# Patient Record
Sex: FEMALE | Race: WHITE | HISPANIC OR LATINO | Employment: UNEMPLOYED | URBAN - METROPOLITAN AREA
[De-identification: names, ages, dates, MRNs, and addresses within clinical notes are randomized per-mention and may not be internally consistent; named-entity substitution may affect disease eponyms.]

---

## 2018-06-13 LAB
ABSOL LYMPHOCYTES (HISTORICAL): 2.2 K/UL (ref 0.5–4)
ALBUMIN SERPL BCP-MCNC: 4 G/DL (ref 3–5.2)
ALP SERPL-CCNC: 95 U/L (ref 43–122)
ALT SERPL W P-5'-P-CCNC: 33 U/L (ref 9–52)
ANION GAP SERPL CALCULATED.3IONS-SCNC: 11 MMOL/L (ref 5–14)
AST SERPL W P-5'-P-CCNC: 18 U/L (ref 14–36)
BASOPHILS # BLD AUTO: 0 % (ref 0–1)
BASOPHILS # BLD AUTO: 0 K/UL (ref 0–0.1)
BILIRUB SERPL-MCNC: 0.1 MG/DL
BILIRUB UR QL STRIP: NEGATIVE MG/DL
BUN SERPL-MCNC: 8 MG/DL (ref 5–25)
CALCIUM SERPL-MCNC: 9.7 MG/DL (ref 8.4–10.2)
CHLORIDE SERPL-SCNC: 104 MEQ/L (ref 97–108)
CLARITY UR: CLEAR
CO2 SERPL-SCNC: 24 MMOL/L (ref 22–30)
COLOR UR: YELLOW
CREATINE, SERUM (HISTORICAL): 0.53 MG/DL (ref 0.6–1.2)
DEPRECATED RDW RBC AUTO: 14 %
EGFR (HISTORICAL): >60 ML/MIN/1.73 M2
EOSINOPHIL # BLD AUTO: 0.1 K/UL (ref 0–0.4)
EOSINOPHIL NFR BLD AUTO: 1 % (ref 0–6)
GLUCOSE FASTING (HISTORICAL): 92 MG/DL (ref 70–99)
GLUCOSE UR STRIP-MCNC: NEGATIVE MG/DL
HCT VFR BLD AUTO: 38.4 % (ref 36–46)
HGB BLD-MCNC: 12.4 G/DL (ref 12–16)
HGB UR QL STRIP.AUTO: NEGATIVE
KETONES UR STRIP-MCNC: NEGATIVE MG/DL
LEUKOCYTE ESTERASE UR QL STRIP: NEGATIVE
LYMPHOCYTES NFR BLD AUTO: 26 % (ref 25–45)
MCH RBC QN AUTO: 26.3 PG (ref 26–34)
MCHC RBC AUTO-ENTMCNC: 32.4 % (ref 31–36)
MCV RBC AUTO: 81 FL (ref 80–100)
MONOCYTES # BLD AUTO: 0.6 K/UL (ref 0.2–0.9)
MONOCYTES NFR BLD AUTO: 7 % (ref 1–10)
NEUTROPHILS ABS COUNT (HISTORICAL): 5.5 K/UL (ref 1.8–7.8)
NEUTS SEG NFR BLD AUTO: 66 % (ref 45–65)
NITRITE UR QL STRIP: NEGATIVE
PH UR STRIP.AUTO: 6 [PH] (ref 4.5–8)
PLATELET # BLD AUTO: 360 K/MCL (ref 150–450)
POTASSIUM SERPL-SCNC: 4.5 MEQ/L (ref 3.6–5)
PROT UR STRIP-MCNC: NEGATIVE MG/DL
RBC # BLD AUTO: 4.73 M/MCL (ref 4–5.2)
SODIUM SERPL-SCNC: 139 MEQ/L (ref 137–147)
SP GR UR STRIP.AUTO: 1.02 (ref 1–1.04)
TOTAL PROTEIN (HISTORICAL): 7.5 G/DL (ref 5.9–8.4)
UROBILINOGEN UR QL STRIP.AUTO: NEGATIVE MG/DL (ref 0–1)
WBC # BLD AUTO: 8.4 K/MCL (ref 4.5–11)

## 2018-06-27 ENCOUNTER — OFFICE VISIT (OUTPATIENT)
Dept: SURGERY | Facility: CLINIC | Age: 23
End: 2018-06-27

## 2018-06-27 VITALS
DIASTOLIC BLOOD PRESSURE: 76 MMHG | HEIGHT: 61 IN | WEIGHT: 211.9 LBS | BODY MASS INDEX: 40.01 KG/M2 | RESPIRATION RATE: 16 BRPM | HEART RATE: 92 BPM | SYSTOLIC BLOOD PRESSURE: 138 MMHG

## 2018-06-27 DIAGNOSIS — E66.01 MORBID (SEVERE) OBESITY DUE TO EXCESS CALORIES (HCC): Primary | ICD-10-CM

## 2018-06-27 PROCEDURE — 99024 POSTOP FOLLOW-UP VISIT: CPT | Performed by: SPECIALIST

## 2018-06-27 RX ORDER — OXYCODONE AND ACETAMINOPHEN 10; 325 MG/1; MG/1
1 TABLET ORAL EVERY 4 HOURS PRN
COMMUNITY
End: 2018-12-12 | Stop reason: HOSPADM

## 2018-12-10 ENCOUNTER — HOSPITAL ENCOUNTER (INPATIENT)
Facility: HOSPITAL | Age: 23
LOS: 2 days | Discharge: HOME | DRG: 263 | End: 2018-12-12
Attending: SPECIALIST | Admitting: SPECIALIST
Payer: COMMERCIAL

## 2018-12-10 DIAGNOSIS — R00.1 BRADYCARDIA: ICD-10-CM

## 2018-12-10 DIAGNOSIS — E83.42 HYPOMAGNESEMIA: ICD-10-CM

## 2018-12-10 DIAGNOSIS — R74.01 TRANSAMINITIS: ICD-10-CM

## 2018-12-10 DIAGNOSIS — D64.9 ANEMIA, UNSPECIFIED TYPE: ICD-10-CM

## 2018-12-10 DIAGNOSIS — K80.40 CHOLEDOCHOLITHIASIS WITH CHOLECYSTITIS: Primary | ICD-10-CM

## 2018-12-10 DIAGNOSIS — K80.00 CALCULUS OF GALLBLADDER WITH ACUTE CHOLECYSTITIS WITHOUT OBSTRUCTION: ICD-10-CM

## 2018-12-10 PROBLEM — K80.50 CHOLEDOCHOLITHIASIS: Status: ACTIVE | Noted: 2018-12-10

## 2018-12-10 PROBLEM — R79.89 ELEVATED LFTS: Status: ACTIVE | Noted: 2018-12-10

## 2018-12-10 PROBLEM — K80.20 GALL STONE: Status: ACTIVE | Noted: 2018-12-10

## 2018-12-10 PROBLEM — K81.9 CHOLECYSTITIS: Status: ACTIVE | Noted: 2018-12-10

## 2018-12-10 LAB
APTT PPP: 30 SECONDS (ref 23–34)
INR PPP: 1.28 (ref 0.89–1.1)
PROTHROMBIN TIME: 13.4 SECONDS (ref 9.5–11.6)

## 2018-12-10 PROCEDURE — 99252 IP/OBS CONSLTJ NEW/EST SF 35: CPT | Performed by: INTERNAL MEDICINE

## 2018-12-10 PROCEDURE — 85730 THROMBOPLASTIN TIME PARTIAL: CPT | Performed by: SPECIALIST

## 2018-12-10 PROCEDURE — 99222 1ST HOSP IP/OBS MODERATE 55: CPT | Performed by: SPECIALIST

## 2018-12-10 PROCEDURE — 85610 PROTHROMBIN TIME: CPT | Performed by: SPECIALIST

## 2018-12-10 RX ORDER — ONDANSETRON 2 MG/ML
4 INJECTION INTRAMUSCULAR; INTRAVENOUS EVERY 6 HOURS PRN
Status: DISCONTINUED | OUTPATIENT
Start: 2018-12-10 | End: 2018-12-12

## 2018-12-10 RX ORDER — NICOTINE 21 MG/24HR
21 PATCH, TRANSDERMAL 24 HOURS TRANSDERMAL DAILY
Status: DISCONTINUED | OUTPATIENT
Start: 2018-12-11 | End: 2018-12-12

## 2018-12-10 RX ORDER — CEFAZOLIN SODIUM 2 G/50ML
2000 SOLUTION INTRAVENOUS EVERY 8 HOURS
Status: DISCONTINUED | OUTPATIENT
Start: 2018-12-10 | End: 2018-12-11

## 2018-12-10 RX ORDER — CEFAZOLIN SODIUM 2 G/50ML
2000 SOLUTION INTRAVENOUS EVERY 8 HOURS
Status: DISCONTINUED | OUTPATIENT
Start: 2018-12-10 | End: 2018-12-10

## 2018-12-10 RX ORDER — SODIUM CHLORIDE, SODIUM LACTATE, POTASSIUM CHLORIDE, CALCIUM CHLORIDE 600; 310; 30; 20 MG/100ML; MG/100ML; MG/100ML; MG/100ML
125 INJECTION, SOLUTION INTRAVENOUS CONTINUOUS
Status: DISCONTINUED | OUTPATIENT
Start: 2018-12-10 | End: 2018-12-12 | Stop reason: HOSPADM

## 2018-12-10 RX ORDER — PANTOPRAZOLE SODIUM 40 MG/1
40 INJECTION, POWDER, FOR SOLUTION INTRAVENOUS
Status: DISCONTINUED | OUTPATIENT
Start: 2018-12-11 | End: 2018-12-12

## 2018-12-10 RX ORDER — HYDROMORPHONE HCL/PF 1 MG/ML
0.5 SYRINGE (ML) INJECTION
Status: DISCONTINUED | OUTPATIENT
Start: 2018-12-10 | End: 2018-12-11

## 2018-12-10 RX ADMIN — ONDANSETRON HYDROCHLORIDE 4 MG: 2 INJECTION, SOLUTION INTRAMUSCULAR; INTRAVENOUS at 21:58

## 2018-12-10 RX ADMIN — CEFAZOLIN SODIUM 2000 MG: 2 SOLUTION INTRAVENOUS at 21:42

## 2018-12-10 RX ADMIN — SODIUM CHLORIDE, POTASSIUM CHLORIDE, SODIUM LACTATE AND CALCIUM CHLORIDE 125 ML/HR: 600; 310; 30; 20 INJECTION, SOLUTION INTRAVENOUS at 20:08

## 2018-12-10 RX ADMIN — AMPICILLIN AND SULBACTAM 3 G: 2; 1 INJECTION, POWDER, FOR SOLUTION INTRAMUSCULAR; INTRAVENOUS at 20:08

## 2018-12-11 ENCOUNTER — ANESTHESIA EVENT (INPATIENT)
Dept: PERIOP | Facility: HOSPITAL | Age: 23
DRG: 263 | End: 2018-12-11
Payer: COMMERCIAL

## 2018-12-11 ENCOUNTER — APPOINTMENT (INPATIENT)
Dept: RADIOLOGY | Facility: HOSPITAL | Age: 23
DRG: 263 | End: 2018-12-11
Payer: COMMERCIAL

## 2018-12-11 ENCOUNTER — APPOINTMENT (INPATIENT)
Dept: ULTRASOUND IMAGING | Facility: HOSPITAL | Age: 23
DRG: 263 | End: 2018-12-11
Payer: COMMERCIAL

## 2018-12-11 ENCOUNTER — ANESTHESIA (INPATIENT)
Dept: PERIOP | Facility: HOSPITAL | Age: 23
DRG: 263 | End: 2018-12-11
Payer: COMMERCIAL

## 2018-12-11 PROBLEM — R00.1 BRADYCARDIA: Status: ACTIVE | Noted: 2018-12-11

## 2018-12-11 PROBLEM — K80.40 CHOLEDOCHOLITHIASIS WITH CHOLECYSTITIS: Status: ACTIVE | Noted: 2018-12-10

## 2018-12-11 PROBLEM — Z98.84 HISTORY OF GASTRIC BYPASS: Status: ACTIVE | Noted: 2018-12-11

## 2018-12-11 PROBLEM — K80.00 CALCULUS OF GALLBLADDER WITH ACUTE CHOLECYSTITIS WITHOUT OBSTRUCTION: Status: ACTIVE | Noted: 2018-12-10

## 2018-12-11 PROBLEM — K76.0 FATTY INFILTRATION OF LIVER: Status: ACTIVE | Noted: 2018-12-11

## 2018-12-11 PROBLEM — Z72.0 TOBACCO ABUSE: Status: ACTIVE | Noted: 2018-12-11

## 2018-12-11 PROBLEM — R74.01 TRANSAMINITIS: Status: ACTIVE | Noted: 2018-12-10

## 2018-12-11 LAB
ALBUMIN SERPL BCP-MCNC: 3.2 G/DL (ref 3–5.2)
ALP SERPL-CCNC: 139 U/L (ref 43–122)
ALT SERPL W P-5'-P-CCNC: 88 U/L (ref 9–52)
ANION GAP SERPL CALCULATED.3IONS-SCNC: 5 MMOL/L (ref 5–14)
APTT PPP: 31 SECONDS (ref 23–34)
AST SERPL W P-5'-P-CCNC: 73 U/L (ref 14–36)
BASOPHILS # BLD AUTO: 0 THOUSANDS/ΜL (ref 0–0.1)
BASOPHILS NFR BLD AUTO: 1 % (ref 0–1)
BILIRUB SERPL-MCNC: 0.2 MG/DL
BUN SERPL-MCNC: 4 MG/DL (ref 5–25)
CALCIUM SERPL-MCNC: 8.4 MG/DL (ref 8.4–10.2)
CHLORIDE SERPL-SCNC: 105 MMOL/L (ref 97–108)
CO2 SERPL-SCNC: 28 MMOL/L (ref 22–30)
CREAT SERPL-MCNC: 0.51 MG/DL (ref 0.6–1.2)
EOSINOPHIL # BLD AUTO: 0.1 THOUSAND/ΜL (ref 0–0.4)
EOSINOPHIL NFR BLD AUTO: 1 % (ref 0–6)
ERYTHROCYTE [DISTWIDTH] IN BLOOD BY AUTOMATED COUNT: 15.4 %
GFR SERPL CREATININE-BSD FRML MDRD: 135 ML/MIN/1.73SQ M
GLUCOSE SERPL-MCNC: 79 MG/DL (ref 70–99)
HCG SERPL QL: NEGATIVE
HCT VFR BLD AUTO: 34.2 % (ref 36–46)
HGB BLD-MCNC: 10.8 G/DL (ref 12–16)
INR PPP: 1.2 (ref 0.89–1.1)
LYMPHOCYTES # BLD AUTO: 2.1 THOUSANDS/ΜL (ref 0.5–4)
LYMPHOCYTES NFR BLD AUTO: 30 % (ref 25–45)
MCH RBC QN AUTO: 26.6 PG (ref 26–34)
MCHC RBC AUTO-ENTMCNC: 31.7 G/DL (ref 31–36)
MCV RBC AUTO: 84 FL (ref 80–100)
MONOCYTES # BLD AUTO: 0.5 THOUSAND/ΜL (ref 0.2–0.9)
MONOCYTES NFR BLD AUTO: 7 % (ref 1–10)
NEUTROPHILS # BLD AUTO: 4.3 THOUSANDS/ΜL (ref 1.8–7.8)
NEUTS SEG NFR BLD AUTO: 62 % (ref 45–65)
PLATELET # BLD AUTO: 224 THOUSANDS/UL (ref 150–450)
PMV BLD AUTO: 9.8 FL (ref 8.9–12.7)
POTASSIUM SERPL-SCNC: 3.7 MMOL/L (ref 3.6–5)
PROT SERPL-MCNC: 6.1 G/DL (ref 5.9–8.4)
PROTHROMBIN TIME: 12.6 SECONDS (ref 9.5–11.6)
RBC # BLD AUTO: 4.07 MILLION/UL (ref 4–5.2)
SODIUM SERPL-SCNC: 138 MMOL/L (ref 137–147)
WBC # BLD AUTO: 6.9 THOUSAND/UL (ref 4.5–11)

## 2018-12-11 PROCEDURE — 85025 COMPLETE CBC W/AUTO DIFF WBC: CPT | Performed by: SPECIALIST

## 2018-12-11 PROCEDURE — 47563 LAPARO CHOLECYSTECTOMY/GRAPH: CPT | Performed by: SPECIALIST

## 2018-12-11 PROCEDURE — C1729 CATH, DRAINAGE: HCPCS | Performed by: SPECIALIST

## 2018-12-11 PROCEDURE — 99232 SBSQ HOSP IP/OBS MODERATE 35: CPT | Performed by: INTERNAL MEDICINE

## 2018-12-11 PROCEDURE — 0FT44ZZ RESECTION OF GALLBLADDER, PERCUTANEOUS ENDOSCOPIC APPROACH: ICD-10-PCS | Performed by: SPECIALIST

## 2018-12-11 PROCEDURE — 84703 CHORIONIC GONADOTROPIN ASSAY: CPT | Performed by: SPECIALIST

## 2018-12-11 PROCEDURE — 76705 ECHO EXAM OF ABDOMEN: CPT

## 2018-12-11 PROCEDURE — 47531 INJECTION FOR CHOLANGIOGRAM: CPT

## 2018-12-11 PROCEDURE — 85610 PROTHROMBIN TIME: CPT | Performed by: SPECIALIST

## 2018-12-11 PROCEDURE — 88304 TISSUE EXAM BY PATHOLOGIST: CPT | Performed by: PATHOLOGY

## 2018-12-11 PROCEDURE — 80053 COMPREHEN METABOLIC PANEL: CPT | Performed by: SPECIALIST

## 2018-12-11 PROCEDURE — BF101ZZ FLUOROSCOPY OF BILE DUCTS USING LOW OSMOLAR CONTRAST: ICD-10-PCS | Performed by: SPECIALIST

## 2018-12-11 PROCEDURE — 85730 THROMBOPLASTIN TIME PARTIAL: CPT | Performed by: SPECIALIST

## 2018-12-11 PROCEDURE — C9113 INJ PANTOPRAZOLE SODIUM, VIA: HCPCS | Performed by: SPECIALIST

## 2018-12-11 RX ORDER — DEXAMETHASONE SODIUM PHOSPHATE 4 MG/ML
INJECTION, SOLUTION INTRA-ARTICULAR; INTRALESIONAL; INTRAMUSCULAR; INTRAVENOUS; SOFT TISSUE AS NEEDED
Status: DISCONTINUED | OUTPATIENT
Start: 2018-12-11 | End: 2018-12-11 | Stop reason: SURG

## 2018-12-11 RX ORDER — BUPIVACAINE HYDROCHLORIDE 5 MG/ML
INJECTION, SOLUTION PERINEURAL AS NEEDED
Status: DISCONTINUED | OUTPATIENT
Start: 2018-12-11 | End: 2018-12-11 | Stop reason: HOSPADM

## 2018-12-11 RX ORDER — OXYCODONE HYDROCHLORIDE AND ACETAMINOPHEN 5; 325 MG/1; MG/1
1 TABLET ORAL EVERY 4 HOURS PRN
Qty: 20 TABLET | Refills: 0 | Status: SHIPPED | OUTPATIENT
Start: 2018-12-11 | End: 2018-12-12 | Stop reason: HOSPADM

## 2018-12-11 RX ORDER — KETOROLAC TROMETHAMINE 30 MG/ML
INJECTION, SOLUTION INTRAMUSCULAR; INTRAVENOUS AS NEEDED
Status: DISCONTINUED | OUTPATIENT
Start: 2018-12-11 | End: 2018-12-11 | Stop reason: SURG

## 2018-12-11 RX ORDER — FENTANYL CITRATE 50 UG/ML
INJECTION, SOLUTION INTRAMUSCULAR; INTRAVENOUS AS NEEDED
Status: DISCONTINUED | OUTPATIENT
Start: 2018-12-11 | End: 2018-12-11 | Stop reason: SURG

## 2018-12-11 RX ORDER — FENTANYL CITRATE/PF 50 MCG/ML
25 SYRINGE (ML) INJECTION
Status: DISCONTINUED | OUTPATIENT
Start: 2018-12-11 | End: 2018-12-11 | Stop reason: HOSPADM

## 2018-12-11 RX ORDER — HEPARIN SODIUM 5000 [USP'U]/ML
INJECTION, SOLUTION INTRAVENOUS; SUBCUTANEOUS AS NEEDED
Status: DISCONTINUED | OUTPATIENT
Start: 2018-12-11 | End: 2018-12-11 | Stop reason: SURG

## 2018-12-11 RX ORDER — NEOSTIGMINE METHYLSULFATE 1 MG/ML
INJECTION INTRAVENOUS AS NEEDED
Status: DISCONTINUED | OUTPATIENT
Start: 2018-12-11 | End: 2018-12-11 | Stop reason: SURG

## 2018-12-11 RX ORDER — OXYCODONE HYDROCHLORIDE AND ACETAMINOPHEN 5; 325 MG/1; MG/1
2 TABLET ORAL EVERY 4 HOURS PRN
Status: DISCONTINUED | OUTPATIENT
Start: 2018-12-11 | End: 2018-12-12 | Stop reason: HOSPADM

## 2018-12-11 RX ORDER — MIDAZOLAM HYDROCHLORIDE 1 MG/ML
INJECTION INTRAMUSCULAR; INTRAVENOUS AS NEEDED
Status: DISCONTINUED | OUTPATIENT
Start: 2018-12-11 | End: 2018-12-11 | Stop reason: SURG

## 2018-12-11 RX ORDER — SODIUM CHLORIDE 9 MG/ML
INJECTION, SOLUTION INTRAVENOUS AS NEEDED
Status: DISCONTINUED | OUTPATIENT
Start: 2018-12-11 | End: 2018-12-11 | Stop reason: HOSPADM

## 2018-12-11 RX ORDER — GLYCOPYRROLATE 0.2 MG/ML
INJECTION INTRAMUSCULAR; INTRAVENOUS AS NEEDED
Status: DISCONTINUED | OUTPATIENT
Start: 2018-12-11 | End: 2018-12-11 | Stop reason: SURG

## 2018-12-11 RX ORDER — OXYCODONE HYDROCHLORIDE AND ACETAMINOPHEN 5; 325 MG/1; MG/1
1 TABLET ORAL EVERY 4 HOURS PRN
Status: DISCONTINUED | OUTPATIENT
Start: 2018-12-11 | End: 2018-12-12 | Stop reason: HOSPADM

## 2018-12-11 RX ORDER — HYDROMORPHONE HCL/PF 1 MG/ML
0.5 SYRINGE (ML) INJECTION
Status: DISCONTINUED | OUTPATIENT
Start: 2018-12-11 | End: 2018-12-11 | Stop reason: HOSPADM

## 2018-12-11 RX ORDER — DIPHENHYDRAMINE HYDROCHLORIDE 50 MG/ML
12.5 INJECTION INTRAMUSCULAR; INTRAVENOUS ONCE AS NEEDED
Status: COMPLETED | OUTPATIENT
Start: 2018-12-11 | End: 2018-12-11

## 2018-12-11 RX ORDER — SCOLOPAMINE TRANSDERMAL SYSTEM 1 MG/1
1 PATCH, EXTENDED RELEASE TRANSDERMAL
Status: DISCONTINUED | OUTPATIENT
Start: 2018-12-11 | End: 2018-12-12 | Stop reason: HOSPADM

## 2018-12-11 RX ORDER — MAGNESIUM HYDROXIDE 1200 MG/15ML
LIQUID ORAL AS NEEDED
Status: DISCONTINUED | OUTPATIENT
Start: 2018-12-11 | End: 2018-12-11 | Stop reason: HOSPADM

## 2018-12-11 RX ORDER — ONDANSETRON 2 MG/ML
4 INJECTION INTRAMUSCULAR; INTRAVENOUS EVERY 6 HOURS PRN
Status: DISCONTINUED | OUTPATIENT
Start: 2018-12-11 | End: 2018-12-12 | Stop reason: HOSPADM

## 2018-12-11 RX ORDER — PROPOFOL 10 MG/ML
INJECTION, EMULSION INTRAVENOUS AS NEEDED
Status: DISCONTINUED | OUTPATIENT
Start: 2018-12-11 | End: 2018-12-11 | Stop reason: SURG

## 2018-12-11 RX ORDER — ONDANSETRON 2 MG/ML
INJECTION INTRAMUSCULAR; INTRAVENOUS AS NEEDED
Status: DISCONTINUED | OUTPATIENT
Start: 2018-12-11 | End: 2018-12-11

## 2018-12-11 RX ORDER — LIDOCAINE HYDROCHLORIDE 10 MG/ML
INJECTION, SOLUTION INFILTRATION; PERINEURAL AS NEEDED
Status: DISCONTINUED | OUTPATIENT
Start: 2018-12-11 | End: 2018-12-11 | Stop reason: SURG

## 2018-12-11 RX ORDER — HEPARIN SODIUM 5000 [USP'U]/ML
5000 INJECTION, SOLUTION INTRAVENOUS; SUBCUTANEOUS EVERY 8 HOURS SCHEDULED
Status: DISCONTINUED | OUTPATIENT
Start: 2018-12-11 | End: 2018-12-12 | Stop reason: HOSPADM

## 2018-12-11 RX ORDER — ROCURONIUM BROMIDE 10 MG/ML
INJECTION, SOLUTION INTRAVENOUS AS NEEDED
Status: DISCONTINUED | OUTPATIENT
Start: 2018-12-11 | End: 2018-12-11 | Stop reason: SURG

## 2018-12-11 RX ORDER — OXYCODONE HYDROCHLORIDE 5 MG/1
5 TABLET ORAL EVERY 4 HOURS PRN
Status: DISCONTINUED | OUTPATIENT
Start: 2018-12-11 | End: 2018-12-12 | Stop reason: HOSPADM

## 2018-12-11 RX ADMIN — FENTANYL CITRATE 25 MCG: 50 INJECTION INTRAMUSCULAR; INTRAVENOUS at 18:08

## 2018-12-11 RX ADMIN — HYDROMORPHONE HYDROCHLORIDE 0.5 MG: 1 INJECTION, SOLUTION INTRAMUSCULAR; INTRAVENOUS; SUBCUTANEOUS at 12:11

## 2018-12-11 RX ADMIN — SCOPALAMINE 1 PATCH: 1 PATCH, EXTENDED RELEASE TRANSDERMAL at 18:41

## 2018-12-11 RX ADMIN — KETOROLAC TROMETHAMINE 30 MG: 30 INJECTION, SOLUTION INTRAMUSCULAR; INTRAVENOUS at 17:15

## 2018-12-11 RX ADMIN — HEPARIN SODIUM 5000 UNITS: 5000 INJECTION, SOLUTION INTRAVENOUS; SUBCUTANEOUS at 21:11

## 2018-12-11 RX ADMIN — CEFAZOLIN SODIUM 2000 MG: 2 SOLUTION INTRAVENOUS at 12:14

## 2018-12-11 RX ADMIN — HYDROMORPHONE HYDROCHLORIDE 0.5 MG: 1 INJECTION, SOLUTION INTRAMUSCULAR; INTRAVENOUS; SUBCUTANEOUS at 05:38

## 2018-12-11 RX ADMIN — FENTANYL CITRATE 25 MCG: 50 INJECTION INTRAMUSCULAR; INTRAVENOUS at 18:00

## 2018-12-11 RX ADMIN — CEFAZOLIN SODIUM 2000 MG: 2 SOLUTION INTRAVENOUS at 04:48

## 2018-12-11 RX ADMIN — OXYCODONE HYDROCHLORIDE AND ACETAMINOPHEN 2 TABLET: 5; 325 TABLET ORAL at 22:19

## 2018-12-11 RX ADMIN — LIDOCAINE HYDROCHLORIDE 50 MG: 10 INJECTION, SOLUTION INFILTRATION; PERINEURAL at 15:45

## 2018-12-11 RX ADMIN — SODIUM CHLORIDE, POTASSIUM CHLORIDE, SODIUM LACTATE AND CALCIUM CHLORIDE 125 ML/HR: 600; 310; 30; 20 INJECTION, SOLUTION INTRAVENOUS at 04:48

## 2018-12-11 RX ADMIN — DIPHENHYDRAMINE HYDROCHLORIDE 12.5 MG: 50 INJECTION INTRAMUSCULAR; INTRAVENOUS at 18:22

## 2018-12-11 RX ADMIN — FENTANYL CITRATE 100 MCG: 50 INJECTION INTRAMUSCULAR; INTRAVENOUS at 15:40

## 2018-12-11 RX ADMIN — HYDROMORPHONE HYDROCHLORIDE 0.5 MG: 1 INJECTION, SOLUTION INTRAMUSCULAR; INTRAVENOUS; SUBCUTANEOUS at 08:56

## 2018-12-11 RX ADMIN — PROPOFOL 150 MG: 10 INJECTION, EMULSION INTRAVENOUS at 15:45

## 2018-12-11 RX ADMIN — GLYCOPYRROLATE 0.2 MG: 0.2 INJECTION, SOLUTION INTRAMUSCULAR; INTRAVENOUS at 16:04

## 2018-12-11 RX ADMIN — DEXAMETHASONE SODIUM PHOSPHATE 4 MG: 4 INJECTION, SOLUTION INTRA-ARTICULAR; INTRALESIONAL; INTRAMUSCULAR; INTRAVENOUS; SOFT TISSUE at 15:49

## 2018-12-11 RX ADMIN — OXYCODONE HYDROCHLORIDE 5 MG: 5 TABLET ORAL at 19:04

## 2018-12-11 RX ADMIN — GLYCOPYRROLATE 0.4 MG: 0.2 INJECTION, SOLUTION INTRAMUSCULAR; INTRAVENOUS at 17:27

## 2018-12-11 RX ADMIN — ONDANSETRON HYDROCHLORIDE 4 MG: 2 INJECTION, SOLUTION INTRAMUSCULAR; INTRAVENOUS at 15:38

## 2018-12-11 RX ADMIN — NEOSTIGMINE METHYLSULFATE 3 MG: 1 INJECTION INTRAVENOUS at 17:27

## 2018-12-11 RX ADMIN — HEPARIN SODIUM 5000 UNITS: 5000 INJECTION, SOLUTION INTRAVENOUS; SUBCUTANEOUS at 15:50

## 2018-12-11 RX ADMIN — ROCURONIUM BROMIDE 50 MG: 10 INJECTION INTRAVENOUS at 15:45

## 2018-12-11 RX ADMIN — MIDAZOLAM HYDROCHLORIDE 2 MG: 1 INJECTION, SOLUTION INTRAMUSCULAR; INTRAVENOUS at 15:40

## 2018-12-11 RX ADMIN — SODIUM CHLORIDE, POTASSIUM CHLORIDE, SODIUM LACTATE AND CALCIUM CHLORIDE: 600; 310; 30; 20 INJECTION, SOLUTION INTRAVENOUS at 15:35

## 2018-12-11 RX ADMIN — PANTOPRAZOLE SODIUM 40 MG: 40 INJECTION, POWDER, FOR SOLUTION INTRAVENOUS at 08:31

## 2018-12-11 RX ADMIN — FENTANYL CITRATE 25 MCG: 50 INJECTION INTRAMUSCULAR; INTRAVENOUS at 18:15

## 2018-12-11 RX ADMIN — SODIUM CHLORIDE, POTASSIUM CHLORIDE, SODIUM LACTATE AND CALCIUM CHLORIDE: 600; 310; 30; 20 INJECTION, SOLUTION INTRAVENOUS at 16:50

## 2018-12-11 RX ADMIN — SODIUM CHLORIDE, POTASSIUM CHLORIDE, SODIUM LACTATE AND CALCIUM CHLORIDE 125 ML/HR: 600; 310; 30; 20 INJECTION, SOLUTION INTRAVENOUS at 14:52

## 2018-12-11 RX ADMIN — HYDROMORPHONE HYDROCHLORIDE 0.5 MG: 1 INJECTION, SOLUTION INTRAMUSCULAR; INTRAVENOUS; SUBCUTANEOUS at 02:12

## 2018-12-11 RX ADMIN — FENTANYL CITRATE 25 MCG: 50 INJECTION INTRAMUSCULAR; INTRAVENOUS at 17:49

## 2018-12-11 NOTE — NURSING NOTE
Pt received this am, a+o, NPO for OR this afternoon, pt in no distress, resting in bed, will monitor

## 2018-12-11 NOTE — OP NOTE
OPERATIVE REPORT  PATIENT NAME: Boris Kerns    :  1995  MRN: [id number]  Pt Location:  OR ROOM 08    SURGERY DATE: 2018    Surgeon(s) and Role:     * Chey Sheffield MD - Primary    Preop Diagnosis:  Choledocholithiasis with cholecystitis [K80 40]  Calculus of gallbladder with acute cholecystitis without obstruction [K80 00]    Post-Op Diagnosis Codes:     * Choledocholithiasis with cholecystitis [K80 40]     * Calculus of gallbladder with acute cholecystitis without obstruction [K80 00]    Procedure(s) (LRB):  CHOLECYSTECTOMY LAPAROSCOPIC (N/A)  CHOLANGIOGRAM (N/A)    Specimen(s):  ID Type Source Tests Collected by Time Destination   1 : gallbladder Tissue Gallbladder TISSUE EXAM Chey Sheffield MD 2018  5:11 PM        Estimated Blood Loss:   Minimal    Drains:       Anesthesia Type:   General    Operative Indications:  Choledocholithiasis with cholecystitis [K80 40]  Calculus of gallbladder with acute cholecystitis without obstruction [K80 00]      Operative Findings:  Patient with acute calculous cholecystitis  A cholangiogram demonstrated choledocholithiasis with initially no flow into the duodenum  After various maneuvers there was excellent flow into the duodenum and no filling defects in the common bile duct    Complications:   None    Procedure and Technique:  Patient felt the upper room postoperatively supine position  Under adequate general endotracheal anesthesia the abdomen was prepped and draped in usual sterile fashion  A small incision made left subcostal area and a previously well-healed laparoscopic scar  Vision is inserted and was insufflated with CO2 to 15 mm of mercury  Needle was removed 5 mm port is inserted  The 5 mm scope was inserted and was visually explored  There was noted be no trauma from needle port placement  There were a paucity of adhesions to the anterior abdominal wall    Additional ports were placed in the umbilicus the right subcostal and the right upper quadrant  These were a 10 , 5 and 5 respectively  5 mm scope was replaced with a 10 mm scope  Attention was placed to the gallbladder  Was grasped and was noted to be stented but could be grasped without decompressing it  It was retracted the right subphrenic area  It was quite distended edematous and acutely inflamed  Had a significant amount of fat covering it  The infundibulum was grasped retracted laterally  The distal end of the gallbladder was identified and this step common duct junction was also identified  The distal end of the gallbladder was then dissected free of some of the fibrotic tissue  The cystic duct was identified and was quite scarred  It was circumferentially dissected and then clipped distally  A small nick was made in the cystic duct  Cholangiocatheter was passed through a catheter passed through the abdominal wall  Dysplastic without difficulty into the cystic duct was then clipped  At that point a cholangiogram was performed  It showed a fairly tortuous cystic duct a significant amount of filling of the common duct and proximal biliary ducts and they all appear to be dilated  There was some filling defects in the distal end of the common bile duct and there was no flow into the duodenum  At that point a syringe of seen was obtained and the duct was flushed under fluoroscopic guidance  Once again there was no flow into the duodenum  This was repeated and once again under direct fluoroscopic vision  This time after  several cc of fluid there was an obvious blush in the duodenum and free flow  At that point additional contrast were tear was obtained and repeat cholangiogram was performed  This demonstrated no filling defects in excellent flow into the duodenum  This was repeated 1 additional time once again excellent flow was identified duodenum with no filling defects  At this point declined catheterization was removed from the cystic duct    It was triple clipped proximally and then divided  Cystic artery was just superior to this and this was circumferentially dissected doubly clipped proximally singly clipped distally then divided  The gallbladder was taken out from liver bed with the hook cautery  There was an excessive amount edema noted consistent with a diagnosis of acute calculous cholecystitis  There was no significant bleeding noted  Gallbladder wall was quite thick  Gallbladder was then taken off the liver bed and then was brought out through the umbilical port  Was difficult removed since it had a significant amount of stone debris in it and various maneuvers were used to facilitate this  The port site was dilated somewhat the gallbladder was decompressed and then some of the gallstones were removed  Eventually the fascial defect was made larger and the gallbladder was brought out through the fascia  Of course he got stuck in the skin and this was also made longer with the 11  Scalpel blade  Was sent to pathology for histological diagnosis  At this point the area was inspected for bleeding no bleeding was noted  There is irrigated through solution was suction from the field  The cystic duct and cystic artery clips were inspected noted be in place and functional   At that point after removing any residual fluid all CO2 and ports removed  The fascial defect of the umbilicus closed with a figure-of-eight 0 Vicryl suture  All ports were infiltrated with have some marking  All port sites closed with 4 0 Monocryl in a subcuticular fashion  Benzoin Steri-Strips applied  Estimated blood loss minimal patient tolerated the procedure well and delivered to the recovery room in stable condition     I was present for the entire procedure    Patient Disposition:  PACU     SIGNATURE: Kati Bhat MD  DATE: December 11, 2018  TIME: 5:36 PM

## 2018-12-11 NOTE — NURSING NOTE
A&Ox3  No c/o pain  Lungs CTA  No SOB  HR regular  No edema  +PP  ABD soft NT ND with +BS  No N/V/D  Skin warm D&I  VSS  Resting comfortably with call bell in reach

## 2018-12-11 NOTE — ASSESSMENT & PLAN NOTE
Continue care per primary team  Evaluated by General surgery and interventional radiology consult placed for possible percutaneous drainage  If no IR intervention planned then patient will likely undergo a laparoscopic cholecystectomy

## 2018-12-11 NOTE — ASSESSMENT & PLAN NOTE
· Lifestyle modifications are recommended including improving her diet and increasing her amount of exercise  · Outpatient surveillance imaging with her PCP

## 2018-12-11 NOTE — ANESTHESIA PREPROCEDURE EVALUATION
Review of Systems/Medical History  Patient summary reviewed  Chart reviewed      Cardiovascular  Exercise tolerance (METS): >4,     Pulmonary  Smoker ,        GI/Hepatic    Bariatric surgery (June 2018),        Negative  ROS        Endo/Other    Obesity    GYN       Hematology  Anemia ,     Musculoskeletal  Negative musculoskeletal ROS        Neurology  Negative neurology ROS      Psychology   Negative psychology ROS                   Anesthesia Plan  ASA Score- 2     Anesthesia Type- general with ASA Monitors  Additional Monitors:   Airway Plan: ETT  Plan Factors-    Induction- intravenous  Postoperative Plan- Plan for postoperative opioid use  Informed Consent-   I personally reviewed this patient with the CRNA  Discussed and agreed on the Anesthesia Plan with the CRNA  Duane Roa

## 2018-12-11 NOTE — ASSESSMENT & PLAN NOTE
· Continue IV antibiotics  · NPO  · IV fluids  · For a laparoscopic cholecystectomy today, 12/11/2018, by Dr Santa Lopez  · Pain control

## 2018-12-11 NOTE — H&P
Chief Complaint:  Calculous cholecystitis with choledocholithiasis      History of Present Illness:  Patient is a 54-year-old  female on whom a laparoscopic John-en-Y gastric bypass was performed in June of this year  Since that time she has lost 80 lb and has been doing quite well  Over the past 24 hours she developed severe epigastric and right upper quadrant abdominal pain  She presented to the emergency room at [address] [place] [address] where she was admitted with evidence of calculous cholecystitis and choledocholithiasis  She had multiple imaging studies that demonstrated calculous cholecystitis and choledocholithiasis  Her liver function tests were elevated  Unfortunately status post laparoscopic John-en-Y gastric bypass she is on able to undergo ERCP with papillotomy  At that time our office was notified of this and she was transferred to to [facility] for definitive therapy  Past Medical History: No past medical history on file        Past Surgical History:    Past Surgical History:   Procedure Laterality Date    ABDOMINAL SURGERY      JOHN-EN-Y PROCEDURE  06/2018         Allergies:  No Known Allergies      Medications:    Current Facility-Administered Medications:     ampicillin-sulbactam (UNASYN) 3 g in sodium chloride 0 9 % 100 mL IVPB, 3 g, Intravenous, Once, Igor Bray MD    HYDROmorphone (DILAUDID) injection 0 5 mg, 0 5 mg, Intravenous, Q3H PRN, Igor Bray MD    lactated ringers infusion, 125 mL/hr, Intravenous, Continuous, Igor Bray MD    ondansetron [facility]) injection 4 mg, 4 mg, Intravenous, Q6H PRN, Igor Bray MD  [facility] ON 12/11/2018] pantoprazole (PROTONIX) injection 40 mg, 40 mg, Intravenous, Q24H [address], Igor Bray MD      Social History:  Social History     History   Alcohol Use No     History   Drug Use No     History   Smoking Status    Light Tobacco Smoker    Packs/day: 0 25   Smokeless Tobacco    Never Used         Family History:  No family history on file  Review of Systems:    weight loss    Vitals:  Vitals:    12/10/18 1903   BP: 141/81   Pulse: 58   Resp: 18   Temp: 98 1 °F (36 7 °C)   SpO2: 97%       Physical Exam:  Patient is a young adult  female who is awake alert no distress  Vital signs are as above  Skin warm dry  Head normocephalic and atraumatic  Eyes SOL a m  Intact sclerae are anicteric  Ears and nose within normal limits  Throat gag reflex intact  Neck no masses thyromegaly  Back no CVA or spinal tenderness  Lungs clear to a and P  Cor regular rate and rhythm no murmurs carotid bruits  Abdomen soft obvious weight loss  Scars consistent with laparoscopic surgery  She has mild tenderness in right upper quadrant no palpable masses or hernias are noted  Extremities negative CCE  Neurologically A&O x3 cranial nerves 2-12 intact      Lab Results: I have personally reviewed pertinent reports  See below  Imaging: I have personally reviewed pertinent reports  EKG, Pathology, and Other Studies: I have personally reviewed pertinent reports  No visits with results within 1 Day(s) from this visit  Latest known visit with results is:   Admission on 06/21/2018, Discharged on 06/22/2018   Component Date Value    PREGNANCY TEST URINE 06/21/2018 NEGATIVE          Impression:  Calculous cholecystitis with choledocholithiasis in a patient status post laparoscopic John-en-Y gastric bypass  Course the patient is not a candidate for ERCP  Plan:  Admit  IV fluids IV antibiotics  Consult interventional Radiology for percutaneous transhepatic cholangiography  Will ultimately require laparoscopic cholecystectomy

## 2018-12-11 NOTE — SOCIAL WORK
Pt admitted for treatment of calculous cholecystitis and choledocholithiasis  Pt has hx of lap John-en-Y gastric bypass by Dr Gomez Love 6/2018  Per Dr Gomez Love, due to this procedure, pt is not a candidate for ERCP and will require laparoscopic cholecystectomy  IR has been consulted  SW met with pt to complete assessment  Pt lives with her sig other Saud Dickerson and dtr  She is currently unemployed/student and relies on her sig other for financial support  Pt is independent with all self-care/home management tasks and relies on Saud Dickerson for transportation needs  Pt notes that she has a PCP but is unable to recall name of physician  Preferred pharmacy is the [facility] [address]  Pt denies any MH hx and reports to smoking 4 cigarettes daily  When medically cleared for discharge, Saud Dickerson is able to provide transportation home  No other questions or concerns from pt at this time  SW will continue to follow for plan of care

## 2018-12-11 NOTE — CONSULTS
Consult- Vasquez Malave 1995, 24 y o  female MRN: [id number]    Unit/Bed#: 5T -01 Encounter: [id number]    Primary Care Provider: No primary care provider on file  Date and time admitted to hospital: 12/10/2018  6:12 PM      Consults    Elevated LFTs   Assessment & Plan    Likely secondary to gallstones  IV fluid hydration and continue to monitor     Choledocholithiasis   Assessment & Plan    Plan as above     Gall stone   Assessment & Plan    Pain control     * Cholecystitis   Assessment & Plan    Continue care per primary team  Evaluated by General surgery and interventional radiology consult placed for possible percutaneous drainage  If no IR intervention planned then patient will likely undergo a laparoscopic cholecystectomy       Total Time for Visit, including Counseling / Coordination of Care: 20 minutes  Greater than 50% of this total time spent on direct patient counseling and coordination of care  Reason for consultation:   Medical management, requested by Dr Raymond Coulter    History of Present Illness:    Vasquez Malave is a 24 y o  female who is admitted for cholecystitis/choledocholithiasis  Patient has a history of John-en-Y bypass surgery and has lost about 70 lb now and been doing well  Patient presented with acute onset of right upper quadrant pain and was found to have cholecystitis and choledocholithiasis  We are asked for medical consultation  Patient is awaiting an IR consult for possible percutaneous drainage otherwise definitive treatment will be laparoscopic cholecystectomy  Other than abdominal pain patient does not have any complaints of chest pain, shortness of breath, nausea, vomiting, diarrhea  Review of Systems:    12 point review of systems is grossly negative unless stated above in HPI    Past Medical and Surgical History:     History reviewed  No pertinent past medical history      Past Surgical History:   Procedure Laterality Date    ABDOMINAL SURGERY  ELA-EN-Y PROCEDURE  06/2018       Meds/Allergies:    Prior to Admission medications    Medication Sig Start Date End Date Taking? Authorizing Provider   oxyCODONE-acetaminophen (PERCOCET)  mg per tablet Take 1 tablet by mouth every 4 (four) hours as needed for moderate pain    Historical Provider, MD     I have reviewed home medications using [facility]  Allergies: No Known Allergies    Social History:     Marital Status: Single     Substance Use History:   History   Alcohol Use No     History   Smoking Status    Light Tobacco Smoker    Packs/day: 0 25   Smokeless Tobacco    Never Used     History   Drug Use No       Family History:    History reviewed  No pertinent family history  Physical Exam:     Vitals:   Blood Pressure: 141/81 (12/10/18 1903)  Pulse: 58 (12/10/18 1903)  Temperature: 98 1 °F (36 7 °C) (12/10/18 1903)  Temp Source: Temporal (12/10/18 1903)  Respirations: 18 (12/10/18 1903)  Height: 5' 1" (154 9 cm) (12/10/18 1800)  Weight - Scale: 70 kg (154 lb 5 2 oz) (12/10/18 1800)  SpO2: 97 % (12/10/18 1903)    General:  No apparent distress  Comfortable  HEENT:  EOMI  Mucous membranes moist   Cardiovascular:  S1-S2  Pulmonary: Clear to auscultation b/l  No rales, rhonchi   Abdomen: soft, NT/ND  BS +ve  Extremities: FROM  No cyanosis, edema  Skin: warm, dry, intact  Neurological: Alert and oriented x 3  No focal deficits  Psychiatric: Appropriate mood and affect      Additional Data:     Lab Results: I have personally reviewed pertinent reports  ·     [employer] [facility] [employer] Records Reviewed: Yes     ** Please Note: This note has been constructed using a voice recognition system   **

## 2018-12-11 NOTE — ANESTHESIA POSTPROCEDURE EVALUATION
Post-Op Assessment Note      CV Status:  Stable    Mental Status:  Awake    Hydration Status:  Euvolemic    PONV Controlled:  None    Airway Patency:  Patent    Post Op Vitals Reviewed: Yes          Staff: Anesthesiologist           BP   121/6371   Temp   98 2   Pulse 71   Resp   20   SpO2   100

## 2018-12-11 NOTE — UTILIZATION REVIEW
Initial Clinical Review    Admission: Date/Time/Statement: 12/10/18 @ 1812 INPATIENT  Direct admission from physician's office  Orders Placed This Encounter   Procedures    Inpatient Admission     Standing Status:   Standing     Number of Occurrences:   1     Order Specific Question:   Admitting Physician     Answer:   Brandon Harrington     Order Specific Question:   Level of Care     Answer:   Med Surg [16]     Order Specific Question:   Bed Type     Answer:   Bariatric [1]     Order Specific Question:   Estimated length of stay     Answer:   More than 2 Midnights     Order Specific Question:   Certification     Answer:   I certify that inpatient services are medically necessary for this patient for a duration of greater than two midnights  See H&P and MD Progress Notes for additional information about the patient's course of treatment  Chief Complaint: epigastric and right upper quadrant abdominal pain  History of Illness:     Patient is a 54-year-old female on whom a laparoscopic John-en-Y gastric bypass was performed in June of this year  Over the past 24 hours she developed severe epigastric and right upper quadrant abdominal pain  She presented to the emergency room at [address] [place] [address] where she was admitted with evidence of calculous cholecystitis and choledocholithiasis  She had multiple imaging studies that demonstrated calculous cholecystitis and choledocholithiasis  Her liver function tests were elevated  Unfortunately status post laparoscopic John-en-Y gastric bypass she is unable to undergo ERCP with papillotomy    At that time our office was notified of this and she was transferred to to [facility] for definitive therapy       Vital Signs:   Temperature Pulse Respirations Blood Pressure SpO2   12/10/18 1800 12/10/18 1800 12/10/18 1800 12/10/18 1800 12/10/18 1800   97 9 °F (36 6 °C) (!) 53 18 124/70 98 %   Pain Score       12/10/18 1817       3        Wt Readings from Last 1 Encounters:   12/10/18 70 kg (154 lb 5 2 oz)       Vital Signs (abnormal): Bradycardia 52-54    Abnormal Labs/Diagnostic Test Results:     AST = 73,  ALT = 88, Alk Phos = 139    H&H = 10 8/34 2    Ultrasound gallbladder: Cholelithiasis without sonographic evidence of cholecystitis  Past Medical/Surgical History:     John-n-Y gastric bypass  Admitting Diagnosis: Gall stone [K80 20]    Age/Sex: 24 y o  female    Assessment/Plan:      Impression:  Calculous cholecystitis with choledocholithiasis in a patient status post laparoscopic John-en-Y gastric bypass  The patient is not a candidate for ERCP      Plan:  Admit  IV fluids IV antibiotics  Consult interventional Radiology for percutaneous transhepatic cholangiography  Will ultimately require laparoscopic cholecystectomy        Admission Orders: Internal Medicine consult, activity as tolerated, sequential compression device  Scheduled Meds:   Current Facility-Administered Medications:  cefazolin 2,000 mg Intravenous Q8H   HYDROmorphone 0 5 mg Intravenous Q3H PRN x 4 doses 12/11 @ 0212, 0538, 0856, 1211 pre-op   nicotine 21 mg Transdermal Daily   ondansetron 4 mg Intravenous Q6H PRN   pantoprazole 40 mg Intravenous Q24H [address]     Continuous Infusions:   lactated ringers 125 mL/hr       Patient is scheduled for laparoscopic cholecystectomy 12/11/18  [facility] Review Department  Phone: [phone]; Fax [phone]  [email or web address]  ATTENTION: Please call with any questions or concerns to [phone]  and carefully listen to the prompts so that you are directed to the right person  Send all requests for admission clinical reviews, approved or denied determinations and any other requests to fax [phone]   All voicemails are confidential

## 2018-12-11 NOTE — ASSESSMENT & PLAN NOTE
· Check an acute hepatitis panel  · Avoid all hepatotoxic agents  · Follow the LFT's (liver function tests)

## 2018-12-12 VITALS
TEMPERATURE: 97.5 F | RESPIRATION RATE: 18 BRPM | WEIGHT: 154.32 LBS | SYSTOLIC BLOOD PRESSURE: 113 MMHG | HEART RATE: 52 BPM | DIASTOLIC BLOOD PRESSURE: 56 MMHG | OXYGEN SATURATION: 100 % | HEIGHT: 61 IN | BODY MASS INDEX: 29.14 KG/M2

## 2018-12-12 PROBLEM — D64.9 ANEMIA: Status: ACTIVE | Noted: 2018-12-12

## 2018-12-12 PROBLEM — E83.42 HYPOMAGNESEMIA: Status: ACTIVE | Noted: 2018-12-12

## 2018-12-12 LAB
ALBUMIN SERPL BCP-MCNC: 3.4 G/DL (ref 3–5.2)
ALP SERPL-CCNC: 130 U/L (ref 43–122)
ALT SERPL W P-5'-P-CCNC: 85 U/L (ref 9–52)
ANION GAP SERPL CALCULATED.3IONS-SCNC: 8 MMOL/L (ref 5–14)
AST SERPL W P-5'-P-CCNC: 82 U/L (ref 14–36)
B-HCG SERPL-ACNC: <3 MIU/ML
BASOPHILS # BLD AUTO: 0 THOUSANDS/ΜL (ref 0–0.1)
BASOPHILS NFR BLD AUTO: 0 % (ref 0–1)
BILIRUB SERPL-MCNC: 0.3 MG/DL
BUN SERPL-MCNC: 2 MG/DL (ref 5–25)
CALCIUM SERPL-MCNC: 8.7 MG/DL (ref 8.4–10.2)
CHLORIDE SERPL-SCNC: 103 MMOL/L (ref 97–108)
CO2 SERPL-SCNC: 27 MMOL/L (ref 22–30)
CREAT SERPL-MCNC: 0.45 MG/DL (ref 0.6–1.2)
EOSINOPHIL # BLD AUTO: 0 THOUSAND/ΜL (ref 0–0.4)
EOSINOPHIL NFR BLD AUTO: 0 % (ref 0–6)
ERYTHROCYTE [DISTWIDTH] IN BLOOD BY AUTOMATED COUNT: 15.5 %
GFR SERPL CREATININE-BSD FRML MDRD: 141 ML/MIN/1.73SQ M
GLUCOSE SERPL-MCNC: 105 MG/DL (ref 70–99)
HAV IGM SER QL: NORMAL
HBV CORE IGM SER QL: NORMAL
HBV SURFACE AG SER QL: NORMAL
HCT VFR BLD AUTO: 35.6 % (ref 36–46)
HCV AB SER QL: NORMAL
HGB BLD-MCNC: 11.1 G/DL (ref 12–16)
LYMPHOCYTES # BLD AUTO: 0.9 THOUSANDS/ΜL (ref 0.5–4)
LYMPHOCYTES NFR BLD AUTO: 11 % (ref 25–45)
MAGNESIUM SERPL-MCNC: 1.6 MG/DL (ref 1.6–2.3)
MCH RBC QN AUTO: 26.4 PG (ref 26–34)
MCHC RBC AUTO-ENTMCNC: 31.3 G/DL (ref 31–36)
MCV RBC AUTO: 84 FL (ref 80–100)
MONOCYTES # BLD AUTO: 0.4 THOUSAND/ΜL (ref 0.2–0.9)
MONOCYTES NFR BLD AUTO: 5 % (ref 1–10)
NEUTROPHILS # BLD AUTO: 6.7 THOUSANDS/ΜL (ref 1.8–7.8)
NEUTS SEG NFR BLD AUTO: 84 % (ref 45–65)
PHOSPHATE SERPL-MCNC: 4 MG/DL (ref 2.5–4.8)
PLATELET # BLD AUTO: 222 THOUSANDS/UL (ref 150–450)
PMV BLD AUTO: 10.1 FL (ref 8.9–12.7)
POTASSIUM SERPL-SCNC: 4 MMOL/L (ref 3.6–5)
PROCALCITONIN SERPL-MCNC: <0.05 NG/ML
PROT SERPL-MCNC: 6.6 G/DL (ref 5.9–8.4)
RBC # BLD AUTO: 4.21 MILLION/UL (ref 4–5.2)
SODIUM SERPL-SCNC: 138 MMOL/L (ref 137–147)
WBC # BLD AUTO: 8 THOUSAND/UL (ref 4.5–11)

## 2018-12-12 PROCEDURE — 85025 COMPLETE CBC W/AUTO DIFF WBC: CPT | Performed by: INTERNAL MEDICINE

## 2018-12-12 PROCEDURE — 80074 ACUTE HEPATITIS PANEL: CPT | Performed by: INTERNAL MEDICINE

## 2018-12-12 PROCEDURE — 99232 SBSQ HOSP IP/OBS MODERATE 35: CPT | Performed by: INTERNAL MEDICINE

## 2018-12-12 PROCEDURE — 83735 ASSAY OF MAGNESIUM: CPT | Performed by: INTERNAL MEDICINE

## 2018-12-12 PROCEDURE — 84145 PROCALCITONIN (PCT): CPT | Performed by: INTERNAL MEDICINE

## 2018-12-12 PROCEDURE — 84100 ASSAY OF PHOSPHORUS: CPT | Performed by: INTERNAL MEDICINE

## 2018-12-12 PROCEDURE — 80053 COMPREHEN METABOLIC PANEL: CPT | Performed by: INTERNAL MEDICINE

## 2018-12-12 PROCEDURE — 84702 CHORIONIC GONADOTROPIN TEST: CPT | Performed by: INTERNAL MEDICINE

## 2018-12-12 RX ORDER — OXYCODONE HYDROCHLORIDE 5 MG/1
10 TABLET ORAL ONCE
Status: COMPLETED | OUTPATIENT
Start: 2018-12-12 | End: 2018-12-12

## 2018-12-12 RX ORDER — OXYCODONE HYDROCHLORIDE 5 MG/1
5 TABLET ORAL EVERY 4 HOURS PRN
Qty: 10 TABLET | Refills: 0 | Status: SHIPPED | OUTPATIENT
Start: 2018-12-12

## 2018-12-12 RX ADMIN — SODIUM CHLORIDE, POTASSIUM CHLORIDE, SODIUM LACTATE AND CALCIUM CHLORIDE 125 ML/HR: 600; 310; 30; 20 INJECTION, SOLUTION INTRAVENOUS at 06:32

## 2018-12-12 RX ADMIN — OXYCODONE HYDROCHLORIDE 10 MG: 5 TABLET ORAL at 11:05

## 2018-12-12 RX ADMIN — OXYCODONE HYDROCHLORIDE AND ACETAMINOPHEN 2 TABLET: 5; 325 TABLET ORAL at 06:32

## 2018-12-12 RX ADMIN — MAGNESIUM OXIDE TAB 400 MG (241.3 MG ELEMENTAL MG) 800 MG: 400 (241.3 MG) TAB at 11:04

## 2018-12-12 RX ADMIN — HEPARIN SODIUM 5000 UNITS: 5000 INJECTION, SOLUTION INTRAVENOUS; SUBCUTANEOUS at 05:21

## 2018-12-12 NOTE — NURSING NOTE
Patient in bed sleeping  Wakes up to verbal stimuli  Patient remains drowsy and states right ABD pain is about 8-10 after PRN Percocet x2 tabs  Lungs CTA and diminished do to poor effort  No SOB  HR regular  No edema  +PP  ABD soft tender to touch and ND with hypoactive BS  4 ABD incisions noted all well approximated with Steri strips  No drainage , swelling or hematoma from sites  No N/V/D  VSS  Call bell in reach  Will continue to monitor accordingly

## 2018-12-12 NOTE — DISCHARGE INSTRUCTIONS
YOUR LIVER TESTS ARE SLIGHTLY ELEVATED BECAUSE OF YOUR GALLBLADDER PROBLEM  PLEASE AVOID ALL TYLENOL/ACETAMINOPHEN AS WELL AS ALCOHOL  TAKE OXYCODONE ONLY AS YOUR PAIN MEDICATION  DO NOT TAKE PERCOCET, BECAUSE PERCOCET HAS TYLENOL/ACETAMINOPHEN IN IT  Laparoscopic Cholecystectomy   WHAT YOU NEED TO KNOW:   Laparoscopic cholecystectomy is surgery to remove your gallbladder  DISCHARGE INSTRUCTIONS:   Medicines: You may need any of the following:  · Prescription pain medicine  helps decrease pain  Do not wait until the pain is severe before you take this medicine  · NSAIDs  decrease swelling and pain  This medicine can be bought with or without a doctor's order  This medicine can cause stomach bleeding or kidney problems in certain people  If you take blood thinner medicine, always ask your healthcare provider if NSAIDs are safe for you  Read the medicine label and follow the directions on it before using this medicine  · Take your medicine as directed  Contact your healthcare provider if you think your medicine is not helping or if you have side effects  Tell him or her if you are allergic to any medicine  Keep a list of the medicines, vitamins, and herbs you take  Include the amounts, and when and why you take them  Bring the list or the pill bottles to follow-up visits  Carry your medicine list with you in case of an emergency  Follow up with your healthcare provider 2 weeks after surgery, or as directed:  Write down your questions so you remember to ask them during your visits  Wound care:  Care for your surgical wounds as directed  Keep the wounds clean and dry  You may take a shower the day after your surgery  What to eat after surgery:  Eat low-fat foods for 4 to 6 weeks while your body learns to digest fat without a gallbladder  Slowly increase the amount of fat that you eat  Drink plenty of liquids  Ask how much liquid to drink and which liquids are best for you    When to return to work and other activities: You may return to work or other activities as soon as your pain is controlled and you feel comfortable  For many people, this is 5 to 7 days after surgery  Contact your healthcare provider if:   · You have a fever over 101°F (38°C) or chills  · You have pain or nausea that is not relieved by medicine  · You have redness and swelling around your incisions, or blood or pus is leaking from your incisions  · You are constipated or have diarrhea  · Your skin or eyes are yellow, or your bowel movements are pale  · You have questions or concerns about your surgery, condition, or care  Seek care immediately or call 911 if:   · You cannot stop vomiting  · Your bowel movements are black or bloody  · You have pain in your abdomen and it is swollen or hard  · Your arm or leg feels warm, tender, and painful  It may look swollen and red  · You feel lightheaded, short of breath, and have chest pain  · You cough up blood  © 2017 [facility] Information is for End User's use only and may not be sold, redistributed or otherwise used for commercial purposes  All illustrations and images included in CareNotes® are the copyrighted property of A D A M , Inc  or Ralf Nieto  The above information is an  only  It is not intended as medical advice for individual conditions or treatments  Talk to your doctor, nurse or pharmacist before following any medical regimen to see if it is safe and effective for you  How to Stop Smoking   WHAT YOU NEED TO KNOW:   You will improve your health and the health of others around you if you stop smoking  Your risk for heart and lung disease, cancer, stroke, heart attack, and vision problems will also decrease  You can benefit from quitting no matter how long you have smoked  DISCHARGE INSTRUCTIONS:   Prepare to stop smoking:  Nicotine is a highly addictive drug found in cigarettes  Withdrawal symptoms can happen when you stop smoking and make it hard to quit  These include anxiety, depression, irritability, trouble sleeping, and increased appetite  You increase your chances of success if you prepare to quit  · Set a quit date  Nick Ascencio a date that is within the next 2 weeks  Do not pick a day that you think may be stressful or busy  Write down the day or [place] it on your calender  · Tell friends and family that you plan to quit  Explain that you may have withdrawal symptoms when you try to quit  Ask them to support you  They may be able to encourage you and help reduce your stress to make it easier for you to quit  · Make a list of your reasons for quitting  Put the list somewhere you will see it every day, such as your refrigerator  You can look at the list when you have a craving  · Remove all tobacco and nicotine products from your home, car, and workplace  Also, remove anything else that will tempt you to smoke, such as lighters, matches, or ashtrays  Clean your car, home, and places at work that smell like smoke  The smell of smoke can trigger a craving  · Identify triggers that make you want to smoke  This may include activities, feelings, or people  Also write down 1 way you can deal with each of your triggers  For example, if you want to smoke as soon as you wake up, plan another activity during this time, such as exercise  · Make a plan for how you will quit  Learn about the tools that can help you quit, such as medicine, counseling, or nicotine replacement therapy  Choose at least 2 options to help you quit  Tools to help you stop smoking:   · Counseling  from a trained healthcare provider can provide you with support and skills to quit smoking  The provider will also teach you to manage your withdrawal symptoms and cravings  You may receive counseling from one counselor, in group therapy, or through phone therapy called a quit line       · Nicotine replacement therapy (NRT)  such as nicotine patches, gum, or lozenges may help reduce your nicotine cravings  You may get these without a doctor's order  Do not use e-cigarettes or smokeless tobacco in place of cigarettes or to help you quit  They still contain nicotine  · Prescription medicines  such as nasal sprays or nicotine inhalers may help reduce your withdrawal symptoms  Other medicines may also be used to reduce your urge to smoke  Ask your healthcare provider about these medicines  You may need to start certain medicines 2 weeks before your quit date for them to work well  · Hypnosis  is a practice that helps guide you through thoughts and feelings  Hypnosis may help decrease your cravings and make you more willing to quit  · Acupuncture therapy  uses very thin needles to balance energy channels in the body  This is thought to help decrease cravings and symptoms of nicotine withdrawal      · Support groups  let you talk to others who are trying to quit or have already quit  It may be helpful to speak with others about how they quit  Manage your cravings:   · Avoid situations, people, and places that tempt you to smoke  Go to nonsmoking places, such as libraries or restaurants  Understand what tempts you and try to avoid these things  · Keep your hands busy  Hold things such as a stress ball or pen  · Put candy or toothpicks in your mouth  Keep lollipops, sugarless gum, or toothpicks with you at all times  · Do not have alcohol or caffeine  These drinks may tempt you to smoke  Drink healthy liquids such as water or juice instead  · Reward yourself when you resist your cravings  Rewards will motivate you and help you stay positive  · Do an activity that distracts you from your craving  Examples include going for a walk, exercising, or cleaning  Prevent weight gain after you quit:  You may gain a few pounds after you quit smoking   It is healthier for you to gain a few pounds than to continue to smoke  The following can help you prevent weight gain:  · Eat healthy foods  These include fruits, vegetables, whole-grain breads, low-fat dairy products, beans, lean meats, and fish  Eat healthy snacks, such as low-fat yogurt, if you get hungry between meals  · Drink water before, during, and between meals  This will make your stomach feel full and help prevent you from overeating  Ask your healthcare provider how much liquid to drink each day and which liquids are best for you  · Exercise  Take a walk or do some kind of exercise every day  Ask your healthcare provider what exercise is right for you  This may help reduce your cravings and reduce stress  For support and more information:   · [employer]  Phone: 0- 026 - 529-2769  Web Address: www [employer]  © 2017 [address] Information is for End User's use only and may not be sold, redistributed or otherwise used for commercial purposes  All illustrations and images included in CareNotes® are the copyrighted property of A D A M , Inc  or [employer]  The above information is an  only  It is not intended as medical advice for individual conditions or treatments  Talk to your doctor, nurse or pharmacist before following any medical regimen to see if it is safe and effective for you  Oxycodone/Acetaminophen (By mouth)   Acetaminophen (p-myij-f-MIN-oh-fen), Oxycodone Hydrochloride (qe-d-YPR-done artem-droe-KLOR-adeline)  Treats moderate to moderately severe pain  This medicine is a narcotic pain reliever  Brand Name(s): Endocet, Percocet, Primlev, Xartemis XR   There may be other brand names for this medicine  When This Medicine Should Not Be Used: This medicine is not right for everyone  Do not use it if you had an allergic reaction to acetaminophen or oxycodone, or if you have serious breathing problems or paralytic ileus    How to Use This Medicine:   Capsule, Liquid, Tablet, Long Acting Tablet  · Your doctor will tell you how much medicine to use  Do not use more than directed  · An overdose can be dangerous  Follow directions carefully so you do not get too much medicine at one time  · Oral liquid: Measure the oral liquid medicine with a marked measuring spoon, oral syringe, or medicine cup  · Swallow the extended-release tablet whole  Do not crush, break, or chew it  Do not lick or wet the tablet before placing it in your mouth  Do not give this medicine through a feeding tube  · This medicine should come with a Medication Guide  Ask your pharmacist for a copy if you do not have one  · Missed dose: If you miss a dose of this medicine, skip the missed dose and go back to your regular dosing schedule  Do not double doses  · Store the medicine in a closed container at room temperature, away from heat, moisture, and direct light  Ask your pharmacist about the best way to dispose of medicine you do not use  Drugs and Foods to Avoid:   Ask your doctor or pharmacist before using any other medicine, including over-the-counter medicines, vitamins, and herbal products  · Do not use Xartemis XR if you are using or have used an MAO inhibitor in the past 14 days  · Some medicines can affect how this medicine works  Tell your doctor if you are using any of the following:   ¨ Carbamazepine, erythromycin, ketoconazole, lamotrigine, mirtazapine, naltrexone, phenytoin, propranolol, rifampin, ritonavir, tramadol, trazodone, or zidovudine  ¨ Birth control pills  ¨ Diuretic (water pill)  ¨ Medicine to treat depression  ¨ Phenothiazine medicine  ¨ Triptan medicine to treat migraine headaches  · Do not drink alcohol while you are using this medicine  Acetaminophen can damage your liver, and alcohol can increase this risk  Do not take acetaminophen without asking your doctor if you have 3 or more drinks of alcohol every day  · Tell your doctor if you use anything else that makes you sleepy   Some examples are allergy medicine, narcotic pain medicine, and alcohol  Tell your doctor if you are using buprenorphine, butorphanol, nalbuphine, pentazocine, a benzodiazepine, or a muscle relaxer  Warnings While Using This Medicine:   · Tell your doctor if you are pregnant or breastfeeding, or if you have kidney disease, liver disease, heart disease, low blood pressure, breathing problems or lung disease (such as asthma, COPD), thyroid problems, [place] disease, pancreas or gallbladder problems, prostate problems, trouble urinating, or a stomach problems, or a history of head injury or brain damage, seizures, or alcohol or drug abuse  Tell your doctor if you are allergic to codeine  · This medicine may cause the following problems:  ¨ High risk of overdose, which can lead to death  ¨ Respiratory depression (serious breathing problem that can be life-threatening)  ¨ Liver problems  ¨ Serious skin reactions  ¨ Serotonin syndrome (when used with certain medicines)  · This medicine may make you dizzy or drowsy  Do not drive or do anything that could be dangerous until you know how this medicine affects you  Sit or lie down if you feel dizzy  Stand up carefully  · This medicine contains acetaminophen  Read the labels of all other medicines you are using to see if they also contain acetaminophen, or ask your doctor or pharmacist  Ashley Stout not use more than 4 grams (4,000 milligrams) total of acetaminophen in one day  · This medicine can be habit-forming  Do not use more than your prescribed dose  Call your doctor if you think your medicine is not working  · Do not stop using this medicine suddenly  Your doctor will need to slowly decrease your dose before you stop it completely  · This medicine could cause infertility  Talk with your doctor before using this medicine if you plan to have children  · This medicine may cause constipation, especially with long-term use   Ask your doctor if you should use a laxative to prevent and treat constipation  · Keep all medicine out of the reach of children  Never share your medicine with anyone  Possible Side Effects While Using This Medicine:   Call your doctor right away if you notice any of these side effects:  · Allergic reaction: Itching or hives, swelling in your face or hands, swelling or tingling in your mouth or throat, chest tightness, trouble breathing  · Anxiety, restlessness, fast heartbeat, fever, muscle spasms, twitching, diarrhea, seeing or hearing things that are not there  · Blistering, peeling, red skin rash  · Blue lips, fingernails, or skin  · Dark urine or pale stools, loss of appetite, stomach pain, yellow skin or eyes  · Extreme weakness, shallow breathing, uneven heartbeat, seizures, sweating, or cold or clammy skin  · Severe confusion, lightheadedness, dizziness, or fainting  · Severe constipation, nausea, or vomiting  · Trouble breathing or slow breathing  If you notice these less serious side effects, talk with your doctor:   · Headache  · Mild constipation, nausea, or vomiting  · Mild sleepiness or drowsiness  If you notice other side effects that you think are caused by this medicine, tell your doctor  Call your doctor for medical advice about side effects  You may report side effects to FDA at 9-433-FDA-7897  © 2017 [address]  Information is for End User's use only and may not be sold, redistributed or otherwise used for commercial purposes  The above information is an  only  It is not intended as medical advice for individual conditions or treatments  Talk to your doctor, nurse or pharmacist before following any medical regimen to see if it is safe and effective for you  How to Stop Smoking   WHAT YOU NEED TO KNOW:   You will improve your health and the health of others around you if you stop smoking  Your risk for heart and lung disease, cancer, stroke, heart attack, and vision problems will also decrease   You can benefit from quitting no matter how long you have smoked  DISCHARGE INSTRUCTIONS:   Prepare to stop smoking:  Nicotine is a highly addictive drug found in cigarettes  Withdrawal symptoms can happen when you stop smoking and make it hard to quit  These include anxiety, depression, irritability, trouble sleeping, and increased appetite  You increase your chances of success if you prepare to quit  · Set a quit date  Alana Montanez a date that is within the next 2 weeks  Do not pick a day that you think may be stressful or busy  Write down the day or [place] it on your calender  · Tell friends and family that you plan to quit  Explain that you may have withdrawal symptoms when you try to quit  Ask them to support you  They may be able to encourage you and help reduce your stress to make it easier for you to quit  · Make a list of your reasons for quitting  Put the list somewhere you will see it every day, such as your refrigerator  You can look at the list when you have a craving  · Remove all tobacco and nicotine products from your home, car, and workplace  Also, remove anything else that will tempt you to smoke, such as lighters, matches, or ashtrays  Clean your car, home, and places at work that smell like smoke  The smell of smoke can trigger a craving  · Identify triggers that make you want to smoke  This may include activities, feelings, or people  Also write down 1 way you can deal with each of your triggers  For example, if you want to smoke as soon as you wake up, plan another activity during this time, such as exercise  · Make a plan for how you will quit  Learn about the tools that can help you quit, such as medicine, counseling, or nicotine replacement therapy  Choose at least 2 options to help you quit  Tools to help you stop smoking:   · Counseling  from a trained healthcare provider can provide you with support and skills to quit smoking   The provider will also teach you to manage your withdrawal symptoms and cravings  You may receive counseling from one counselor, in group therapy, or through phone therapy called a quit line  · Nicotine replacement therapy (NRT)  such as nicotine patches, gum, or lozenges may help reduce your nicotine cravings  You may get these without a doctor's order  Do not use e-cigarettes or smokeless tobacco in place of cigarettes or to help you quit  They still contain nicotine  · Prescription medicines  such as nasal sprays or nicotine inhalers may help reduce your withdrawal symptoms  Other medicines may also be used to reduce your urge to smoke  Ask your healthcare provider about these medicines  You may need to start certain medicines 2 weeks before your quit date for them to work well  · Hypnosis  is a practice that helps guide you through thoughts and feelings  Hypnosis may help decrease your cravings and make you more willing to quit  · Acupuncture therapy  uses very thin needles to balance energy channels in the body  This is thought to help decrease cravings and symptoms of nicotine withdrawal      · Support groups  let you talk to others who are trying to quit or have already quit  It may be helpful to speak with others about how they quit  Manage your cravings:   · Avoid situations, people, and places that tempt you to smoke  Go to nonsmoking places, such as libraries or restaurants  Understand what tempts you and try to avoid these things  · Keep your hands busy  Hold things such as a stress ball or pen  · Put candy or toothpicks in your mouth  Keep lollipops, sugarless gum, or toothpicks with you at all times  · Do not have alcohol or caffeine  These drinks may tempt you to smoke  Drink healthy liquids such as water or juice instead  · Reward yourself when you resist your cravings  Rewards will motivate you and help you stay positive  · Do an activity that distracts you from your craving    Examples include going for a walk, exercising, or cleaning  Prevent weight gain after you quit:  You may gain a few pounds after you quit smoking  It is healthier for you to gain a few pounds than to continue to smoke  The following can help you prevent weight gain:  · Eat healthy foods  These include fruits, vegetables, whole-grain breads, low-fat dairy products, beans, lean meats, and fish  Eat healthy snacks, such as low-fat yogurt, if you get hungry between meals  · Drink water before, during, and between meals  This will make your stomach feel full and help prevent you from overeating  Ask your healthcare provider how much liquid to drink each day and which liquids are best for you  · Exercise  Take a walk or do some kind of exercise every day  Ask your healthcare provider what exercise is right for you  This may help reduce your cravings and reduce stress  For support and more information:   · [employer]  Phone: 3- 734 - 610-8370  Web Address: www [employer]  © 2017 [address] Information is for End User's use only and may not be sold, redistributed or otherwise used for commercial purposes  All illustrations and images included in CareNotes® are the copyrighted property of A D A M , Inc  or [employer]  The above information is an  only  It is not intended as medical advice for individual conditions or treatments  Talk to your doctor, nurse or pharmacist before following any medical regimen to see if it is safe and effective for you  Cigarette Smoking and Your Health   WHAT YOU NEED TO KNOW:   What are the risks to my health if I smoke tobacco?  Nicotine and other chemicals found in tobacco damage every cell in your body  Even if you are a light smoker, you have an increased risk for cancer, heart disease, and lung disease  If you are pregnant or have diabetes, smoking increases your risk for complications  What are the benefits to my health if I stop smoking?    · You decrease respiratory symptoms such as coughing, wheezing, and shortness of breath  · You reduce your risk for cancers of the lung, mouth, throat, kidney, bladder, pancreas, stomach, and cervix  If you already have cancer, you increase the benefits of chemotherapy  You also reduce your risk for cancer returning or a second cancer from developing  · You reduce your risk for heart disease, blood clots, heart attack, and stroke  · You reduce your risk for lung infections, and diseases such as pneumonia, asthma, chronic bronchitis, and emphysema  · Your circulation improves  More oxygen can be delivered to your body  If you have diabetes, you lower your risk for complications, such as kidney, artery, and eye diseases  You also lower your risk for nerve damage  Nerve damage can lead to amputations, poor vision, and blindness  · You improve your body's ability to heal and to fight infections  What are the health benefits to others if I stop smoking? Tobacco is harmful to nonsmokers who breathe in your secondhand smoke  The following are ways the health of others around you may improve when you stop smoking:  · You lower the risks for lung cancer and heart disease in nonsmoking adults  · If you are pregnant, you lower the risk for miscarriage, early delivery, low birth weight, and stillbirth  You also lower your baby's risk for SIDS, obesity, developmental delay, and neurobehavioral problems, such as ADHD  · If you have children, you lower their risk for ear infections, colds, pneumonia, bronchitis, and asthma  Where can I find more information and support to stop smoking? · [employer]  Phone: 5- 105 - 743-5868  Web Address: [employer]  CARE AGREEMENT:   You have the right to help plan your care  Learn about your health condition and how it may be treated  Discuss treatment options with your caregivers to decide what care you want to receive   You always have the right to refuse treatment  The above information is an  only  It is not intended as medical advice for individual conditions or treatments  Talk to your doctor, nurse or pharmacist before following any medical regimen to see if it is safe and effective for you  © 2017 [address] Information is for End User's use only and may not be sold, redistributed or otherwise used for commercial purposes  All illustrations and images included in CareNotes® are the copyrighted property of A D A M , Inc  or Ralf Nieto

## 2018-12-12 NOTE — ASSESSMENT & PLAN NOTE
· POD #1 S/P Laparoscopic cholecystectomy on 12/11/2018 by Dr Carson Mckeon   · She was treated with IV antibiotics and continuous IV fluids during the hospitalization  · Incentive spirometry 10 times per hour while awake  · Oxycodone will be prescribed for her pain medication upon discharge in the setting of transaminitis

## 2018-12-12 NOTE — PROGRESS NOTES
Anesthesia 24 hours Post Evaluation    Patient: Chantell Hussein    Procedures performed: Procedure(s):  CHOLECYSTECTOMY LAPAROSCOPIC  CHOLANGIOGRAM    Anesthesia type: general    Post pain: adequate analgesia    Last vitals:   Vitals:    12/11/18 2334   BP: 104/56   Pulse: 79   Resp: 17   Temp: 97 7 °F (36 5 °C)   SpO2: 98%       Post vital signs: stable    Level of consciousness: awake, alert and oriented    Respiratory: unassisted, spontaneous ventilation, room air    Cardiovascular: stable and blood pressure at baseline    Hydration: positive balance cont   monitor    No anesthesia complication

## 2018-12-12 NOTE — NURSING NOTE
Pt received this am, chris nod distress, for discharge today, will get picked up after l;unch, abd soft with bs, 4 incisions c+d, instructions given on pain medication at home as well as wound care, activity and follow up  Pt states understanding  Waiting for ride   Will monitor

## 2018-12-12 NOTE — ASSESSMENT & PLAN NOTE
· A nicotine patch was utilized during the hospitalization  · Smoking cessation counseling was given to the patient

## 2018-12-12 NOTE — ASSESSMENT & PLAN NOTE
· Magnesium oxide 800 mg PO x 1 dose today, 12/12/2018  · Recheck a magnesium level in 1 week with her PCP

## 2018-12-12 NOTE — ASSESSMENT & PLAN NOTE
· An acute hepatitis panel was drawn with results pending at the time of discharge  · Avoid all hepatotoxic agents including tylenol/acetaminophen as well as alcohol  · I will prescribed plain oxycodone for the patient upon discharge for pain control  · She should not have any percocet until her transaminitis resolves, since percocet contains tylenol/acetaminophen  · Follow the LFT's (liver function tests) as an outpatient with her PCP  · Outpatient follow-up with Gastroenterology

## 2018-12-12 NOTE — ASSESSMENT & PLAN NOTE
· Lifestyle modifications are recommended including improving her diet and increasing her amount of exercise  · Outpatient surveillance imaging with her PCP  · Outpatient follow-up with Gastroenterology

## 2018-12-12 NOTE — PROGRESS NOTES
Progress Note - Chase Morales 1995, 24 y o  female MRN: [id number]    Unit/Bed#:  -01 Encounter: [id number]    Primary Care Provider: No primary care provider on file     Date and time admitted to hospital: 12/10/2018  6:12 PM        * Calculus of gallbladder with acute cholecystitis without obstruction   Assessment & Plan    · POD #1 S/P Laparoscopic cholecystectomy on 12/11/2018 by Dr Prema Ross   · She was treated with IV antibiotics and continuous IV fluids during the hospitalization  · Incentive spirometry 10 times per hour while awake  · Oxycodone will be prescribed for her pain medication upon discharge in the setting of transaminitis       Hypomagnesemia   Assessment & Plan    · Magnesium oxide 800 mg PO x 1 dose today, 12/12/2018  · Recheck a magnesium level in 1 week with her PCP     Anemia   Assessment & Plan    · Recheck a CBC in 1 week as an outpatient with her PCP     Bradycardia   Assessment & Plan    · Outpatient sleep study to check for obstructive sleep apnea as a cause of the bradycardia     History of gastric bypass   Assessment & Plan    · Management per Dr Prema Ross     Tobacco abuse   Assessment & Plan    · A nicotine patch was utilized during the hospitalization  · Smoking cessation counseling was given to the patient     Fatty infiltration of liver   Assessment & Plan    · Lifestyle modifications are recommended including improving her diet and increasing her amount of exercise  · Outpatient surveillance imaging with her PCP  · Outpatient follow-up with Gastroenterology     Transaminitis   Assessment & Plan    · An acute hepatitis panel was drawn with results pending at the time of discharge  · Avoid all hepatotoxic agents including tylenol/acetaminophen as well as alcohol  · I will prescribed plain oxycodone for the patient upon discharge for pain control  · She should not have any percocet until her transaminitis resolves, since percocet contains tylenol/acetaminophen  · Follow the LFT's (liver function tests) as an outpatient with her PCP  · Outpatient follow-up with Gastroenterology         VTE Pharmacologic Prophylaxis:   Pharmacologic: Heparin  Mechanical VTE Prophylaxis in Place: Yes    Time Spent for Care: 20 minutes  More than 50% of total time spent on counseling and coordination of care as described above  Current Length of Stay: 2 day(s)    Current Patient Status: Inpatient   Certification Statement:  The patient is medically cleared to be discharged to home  Code Status: No Order      Subjective: The patient was seen and examined  She is experiencing right upper quadrant abdominal pain and chest pain this morning  She tolerated a regular diet this morning  Objective:     Vitals:   Temp (24hrs), Av 8 °F (36 6 °C), Min:97 5 °F (36 4 °C), Max:98 2 °F (36 8 °C)    Temp:  [97 5 °F (36 4 °C)-98 2 °F (36 8 °C)] 97 5 °F (36 4 °C)  HR:  [52-79] 52  Resp:  [14-21] 18  BP: (104-128)/(56-78) 113/56  SpO2:  [98 %-100 %] 100 %  Body mass index is 29 16 kg/m²  Input and Output Summary (last 24 hours):        Intake/Output Summary (Last 24 hours) at 18 1120  Last data filed at 18 0643   Gross per 24 hour   Intake           2872 5 ml   Output              300 ml   Net           2572 5 ml       Physical Exam:     Physical Exam  General:  NAD, awake, alert, oriented x 3  HEENT:  NC/AT, mucous membranes moist  Neck:  Supple, No JVP elevation  CV:  + S1, + S2, Bradycardia, Regular rhythm  Pulm:  Lung fields are CTA bilaterally  Abd:  Soft, + Right upper quadrant abdominal pain with palpation, Mild distension  Ext:  No clubbing/cyanosis/edema  Skin:  No rashes      Additional Data:    Labs:      Results from last 7 days  Lab Units 18  0527   WBC Thousand/uL 8 00   HEMOGLOBIN g/dL 11 1*   HEMATOCRIT % 35 6*   PLATELETS Thousands/uL 222   NEUTROS PCT % 84*   LYMPHS PCT % 11*   MONOS PCT % 5   EOS PCT % 0       Results from last 7 days  Lab Units 12/12/18  0527   SODIUM mmol/L 138   POTASSIUM mmol/L 4 0   CHLORIDE mmol/L 103   CO2 mmol/L 27   BUN mg/dL 2*   CREATININE mg/dL 0 45*   ANION GAP mmol/L 8   CALCIUM mg/dL 8 7   ALBUMIN g/dL 3 4   TOTAL BILIRUBIN mg/dL 0 30   ALK PHOS U/L 130*   ALT U/L 85*   AST U/L 82*   GLUCOSE RANDOM mg/dL 105*       Results from last 7 days  Lab Units 12/11/18  0536   INR  1 20*               Results from last 7 days  Lab Units 12/12/18  0527   PROCALCITONIN ng/ml <0 05           * I Have Reviewed All Lab Data Listed Above  * Additional Pertinent Lab Tests Reviewed: [address] Admission Reviewed      Recent Cultures (last 7 days):           Last 24 Hours Medication List:     Current Facility-Administered Medications:  heparin (porcine) 5,000 Units Subcutaneous Q8H 6225 Roosevelt Flores DO    lactated ringers 125 mL/hr Intravenous Continuous Igor Bray MD Last Rate: 125 mL/hr (12/12/18 6213)   ondansetron 4 mg Intravenous Q6H PRN Igor Bray MD    oxyCODONE 5 mg Oral Q4H PRN Luis Miguel Pagan DO    oxyCODONE-acetaminophen 1 tablet Oral Q4H PRN Igor Bray MD    oxyCODONE-acetaminophen 2 tablet Oral Q4H PRN Igor Bray MD    scopolamine 1 patch Transdermal Q72H Breezy Contreras MD         Today, Patient Was Seen By: Luis Miguel Pagan DO    ** Please Note: Dictation voice to text software may have been used in the creation of this document   **

## 2018-12-12 NOTE — NURSING NOTE
Pt received back from pacu into 512, pt drowsy but arouseable, lungs decreased, instructed on IS and encouraged, abd tender with four incisions with steri strips c+d, denies nausea, no distress, will monitor

## 2018-12-13 NOTE — UTILIZATION REVIEW
Notification of Discharge  This is a Notification of Discharge from our facility [address]  Please be advised that this patient has been discharge from our facility  Below you will find the admission and discharge date and time including the patients disposition  PRESENTATION DATE: 12/10/2018  6:12 PM  IP ADMISSION DATE: 12/10/18 1812  DISCHARGE DATE: 12/12/2018  2:00 PM  DISPOSITION: [id number] Utilization Review Department  Phone: [phone]; Fax [phone]  ATTENTION: Please call with any questions or concerns to [phone]  and carefully listen to the prompts so that you are directed to the right person  Send all requests for admission clinical reviews, approved or denied determinations and any other requests to fax [phone]   All voicemails are confidential

## 2018-12-26 ENCOUNTER — TELEPHONE (OUTPATIENT)
Dept: OBGYN CLINIC | Facility: MEDICAL CENTER | Age: 23
End: 2018-12-26

## 2018-12-26 ENCOUNTER — APPOINTMENT (OUTPATIENT)
Dept: LAB | Facility: HOSPITAL | Age: 23
End: 2018-12-26
Attending: INTERNAL MEDICINE
Payer: COMMERCIAL

## 2018-12-26 DIAGNOSIS — D64.9 ANEMIA, UNSPECIFIED TYPE: ICD-10-CM

## 2018-12-26 DIAGNOSIS — R74.01 TRANSAMINITIS: ICD-10-CM

## 2018-12-26 DIAGNOSIS — Z32.01 POSITIVE PREGNANCY TEST: Primary | ICD-10-CM

## 2018-12-26 DIAGNOSIS — Z32.01 POSITIVE PREGNANCY TEST: ICD-10-CM

## 2018-12-26 DIAGNOSIS — E83.42 HYPOMAGNESEMIA: ICD-10-CM

## 2018-12-26 LAB
ABO GROUP BLD: NORMAL
ALBUMIN SERPL BCP-MCNC: 3.5 G/DL (ref 3.5–5)
ALP SERPL-CCNC: 107 U/L (ref 46–116)
ALT SERPL W P-5'-P-CCNC: 40 U/L (ref 12–78)
ANION GAP SERPL CALCULATED.3IONS-SCNC: 11 MMOL/L (ref 4–13)
AST SERPL W P-5'-P-CCNC: 36 U/L (ref 5–45)
B-HCG SERPL-ACNC: 2916.4 MIU/ML
BASOPHILS # BLD AUTO: 0.03 THOUSANDS/ΜL (ref 0–0.1)
BASOPHILS NFR BLD AUTO: 1 % (ref 0–1)
BILIRUB SERPL-MCNC: 0.28 MG/DL (ref 0.2–1)
BLD GP AB SCN SERPL QL: NEGATIVE
BUN SERPL-MCNC: 11 MG/DL (ref 5–25)
CALCIUM SERPL-MCNC: 8.9 MG/DL (ref 8.3–10.1)
CHLORIDE SERPL-SCNC: 103 MMOL/L (ref 100–108)
CO2 SERPL-SCNC: 24 MMOL/L (ref 21–32)
CREAT SERPL-MCNC: 0.58 MG/DL (ref 0.6–1.3)
EOSINOPHIL # BLD AUTO: 0.04 THOUSAND/ΜL (ref 0–0.61)
EOSINOPHIL NFR BLD AUTO: 1 % (ref 0–6)
ERYTHROCYTE [DISTWIDTH] IN BLOOD BY AUTOMATED COUNT: 15 % (ref 11.6–15.1)
GFR SERPL CREATININE-BSD FRML MDRD: 129 ML/MIN/1.73SQ M
GLUCOSE SERPL-MCNC: 88 MG/DL (ref 65–140)
HCT VFR BLD AUTO: 37.5 % (ref 34.8–46.1)
HGB BLD-MCNC: 11.9 G/DL (ref 11.5–15.4)
IMM GRANULOCYTES # BLD AUTO: 0.02 THOUSAND/UL (ref 0–0.2)
IMM GRANULOCYTES NFR BLD AUTO: 0 % (ref 0–2)
LYMPHOCYTES # BLD AUTO: 1.58 THOUSANDS/ΜL (ref 0.6–4.47)
LYMPHOCYTES NFR BLD AUTO: 25 % (ref 14–44)
MAGNESIUM SERPL-MCNC: 1.9 MG/DL (ref 1.6–2.6)
MCH RBC QN AUTO: 27.2 PG (ref 26.8–34.3)
MCHC RBC AUTO-ENTMCNC: 31.7 G/DL (ref 31.4–37.4)
MCV RBC AUTO: 86 FL (ref 82–98)
MONOCYTES # BLD AUTO: 0.48 THOUSAND/ΜL (ref 0.17–1.22)
MONOCYTES NFR BLD AUTO: 8 % (ref 4–12)
NEUTROPHILS # BLD AUTO: 4.14 THOUSANDS/ΜL (ref 1.85–7.62)
NEUTS SEG NFR BLD AUTO: 65 % (ref 43–75)
NRBC BLD AUTO-RTO: 0 /100 WBCS
PLATELET # BLD AUTO: 318 THOUSANDS/UL (ref 149–390)
PMV BLD AUTO: 10.6 FL (ref 8.9–12.7)
POTASSIUM SERPL-SCNC: 4.2 MMOL/L (ref 3.5–5.3)
PROGEST SERPL-MCNC: 17.7 NG/ML
PROT SERPL-MCNC: 7.5 G/DL (ref 6.4–8.2)
RBC # BLD AUTO: 4.37 MILLION/UL (ref 3.81–5.12)
RH BLD: NEGATIVE
SODIUM SERPL-SCNC: 138 MMOL/L (ref 136–145)
SPECIMEN EXPIRATION DATE: NORMAL
WBC # BLD AUTO: 6.29 THOUSAND/UL (ref 4.31–10.16)

## 2018-12-26 PROCEDURE — 85025 COMPLETE CBC W/AUTO DIFF WBC: CPT

## 2018-12-26 PROCEDURE — 86850 RBC ANTIBODY SCREEN: CPT

## 2018-12-26 PROCEDURE — 86901 BLOOD TYPING SEROLOGIC RH(D): CPT

## 2018-12-26 PROCEDURE — 83735 ASSAY OF MAGNESIUM: CPT

## 2018-12-26 PROCEDURE — 84144 ASSAY OF PROGESTERONE: CPT

## 2018-12-26 PROCEDURE — 80053 COMPREHEN METABOLIC PANEL: CPT

## 2018-12-26 PROCEDURE — 86900 BLOOD TYPING SEROLOGIC ABO: CPT

## 2018-12-26 PROCEDURE — 84702 CHORIONIC GONADOTROPIN TEST: CPT

## 2018-12-26 PROCEDURE — 36415 COLL VENOUS BLD VENIPUNCTURE: CPT

## 2018-12-26 NOTE — TELEPHONE ENCOUNTER
Pt scheduled for labs in our office but called stating closer to the hospital   Lab slips entered for hcg, progesterone and type and screen  Pt aware we will call with f/u

## 2018-12-31 ENCOUNTER — TELEPHONE (OUTPATIENT)
Dept: OBGYN CLINIC | Facility: MEDICAL CENTER | Age: 23
End: 2018-12-31

## 2018-12-31 DIAGNOSIS — Z32.01 POSITIVE BLOOD PREGNANCY TEST: Primary | ICD-10-CM

## 2018-12-31 NOTE — TELEPHONE ENCOUNTER
Notified of HCG level and need for repeat  [place] and will have drawn ASAP  Rh Negative status reviewed with patient  Advised to contact office for any bleeding    Advised to seek care in ED for severe pain or bleeding

## 2019-01-04 ENCOUNTER — TELEPHONE (OUTPATIENT)
Dept: OBGYN CLINIC | Facility: MEDICAL CENTER | Age: 24
End: 2019-01-04

## 2019-01-04 NOTE — TELEPHONE ENCOUNTER
Left message on voicemail reminding of need for repeat labwork    Importance of completing ASAP stressed

## 2019-01-07 ENCOUNTER — APPOINTMENT (OUTPATIENT)
Dept: LAB | Facility: MEDICAL CENTER | Age: 24
End: 2019-01-07
Payer: COMMERCIAL

## 2019-01-07 DIAGNOSIS — Z32.01 POSITIVE BLOOD PREGNANCY TEST: ICD-10-CM

## 2019-01-07 LAB — B-HCG SERPL-ACNC: ABNORMAL MIU/ML

## 2019-01-07 PROCEDURE — 36415 COLL VENOUS BLD VENIPUNCTURE: CPT

## 2019-01-07 PROCEDURE — 84702 CHORIONIC GONADOTROPIN TEST: CPT

## 2019-01-08 ENCOUNTER — TELEPHONE (OUTPATIENT)
Dept: OBGYN CLINIC | Facility: MEDICAL CENTER | Age: 24
End: 2019-01-08

## 2019-01-08 DIAGNOSIS — Z32.01 POSITIVE BLOOD PREGNANCY TEST: Primary | ICD-10-CM

## 2019-01-14 ENCOUNTER — HOSPITAL ENCOUNTER (OUTPATIENT)
Dept: ULTRASOUND IMAGING | Facility: HOSPITAL | Age: 24
Discharge: HOME | End: 2019-01-14
Payer: COMMERCIAL

## 2019-01-14 DIAGNOSIS — Z32.01 POSITIVE BLOOD PREGNANCY TEST: ICD-10-CM

## 2019-01-14 PROCEDURE — 76801 OB US < 14 WKS SINGLE FETUS: CPT

## 2019-01-21 ENCOUNTER — TELEPHONE (OUTPATIENT)
Dept: OBGYN CLINIC | Facility: MEDICAL CENTER | Age: 24
End: 2019-01-21

## 2019-01-21 NOTE — TELEPHONE ENCOUNTER
No-show for [facility] ED visit today    Left message on voicemail to re-schedule appointment

## 2019-02-04 ENCOUNTER — TRANSCRIBE ORDERS (OUTPATIENT)
Dept: SLEEP CENTER | Facility: CLINIC | Age: 24
End: 2019-02-04

## 2019-02-04 DIAGNOSIS — R00.1 BRADYCARDIA: Primary | ICD-10-CM

## 2019-02-04 DIAGNOSIS — Z76.89 SLEEP CONCERN: ICD-10-CM

## 2019-02-15 LAB
AMORPH SED URNS QL MICRO: ABNORMAL /HPF
APPEARANCE UR: ABNORMAL
BACTERIA UR QL AUTO: ABNORMAL /HPF
BILIRUB UR QL STRIP: NEGATIVE
C TRACH RRNA SPEC QL NAA+PROBE: NOT DETECTED
CAOX CRY #/AREA URNS HPF: ABNORMAL /HPF
CLINICAL INFO: NORMAL
COLOR UR: ABNORMAL
CYTO CVX: NORMAL
CYTOLOGY CMNT CVX/VAG CYTO-IMP: NORMAL
DATE PREVIOUS BX: NORMAL
GLUCOSE UR QL STRIP: NEGATIVE
HGB UR QL STRIP: NEGATIVE
HYALINE CASTS #/AREA URNS LPF: ABNORMAL /LPF
KETONES UR QL STRIP: ABNORMAL
LEUKOCYTE ESTERASE UR QL STRIP: ABNORMAL
LMP START DATE: NORMAL
N GONORRHOEA RRNA SPEC QL NAA+PROBE: NOT DETECTED
NITRITE UR QL STRIP: NEGATIVE
PH UR STRIP: 6 [PH] (ref 5–8)
PROT UR QL STRIP: ABNORMAL
RBC #/AREA URNS HPF: ABNORMAL /HPF
SERVICE CMNT-IMP: ABNORMAL
SL AMB PREV. PAP:: NORMAL
SP GR UR STRIP: 1.04 (ref 1–1.03)
SPECIMEN SOURCE CVX/VAG CYTO: NORMAL
SQUAMOUS #/AREA URNS HPF: ABNORMAL /HPF
WBC #/AREA URNS HPF: ABNORMAL /HPF

## 2021-08-22 NOTE — PROGRESS NOTES
Progress Note - Ron Tinajero 1995, 24 y o  female MRN: [id number]    Unit/Bed#: OR [address] Encounter: [id number]    Primary Care Provider: No primary care provider on file  Date and time admitted to hospital: 12/10/2018  6:12 PM        * Calculus of gallbladder with acute cholecystitis without obstruction   Assessment & Plan    · Continue IV antibiotics  · NPO  · IV fluids  · For a laparoscopic cholecystectomy today, 2018, by Dr Santa Lopez  · Pain control     Bradycardia   Assessment & Plan    · Outpatient sleep study to check for obstructive sleep apnea as a cause of the bradycardia     History of gastric bypass   Assessment & Plan    · Management per Dr Santa Lopez     Tobacco abuse   Assessment & Plan    · Nicotine patch  · Smoking cessation counseling     Fatty infiltration of liver   Assessment & Plan    · Lifestyle modifications are recommended including improving her diet and increasing her amount of exercise  · Outpatient surveillance imaging with her PCP     Transaminitis   Assessment & Plan    · Check an acute hepatitis panel  · Avoid all hepatotoxic agents  · Follow the LFT's (liver function tests)         VTE Pharmacologic Prophylaxis:   Pharmacologic: Heparin  Mechanical VTE Prophylaxis in Place: Yes    Time Spent for Care: 20 minutes  More than 50% of total time spent on counseling and coordination of care as described above  Current Length of Stay: 1 day(s)    Current Patient Status: Inpatient   Certification Statement: The patient will continue to require additional inpatient hospital stay due to the need for a laparoscopic cholecystectomy today, 2018  Code Status: No Order      Subjective: The patient was seen and examined  The patient complains of right upper quadrant abdominal pain as well as nausea      Objective:     Vitals:   Temp (24hrs), Av 8 °F (36 6 °C), Min:97 3 °F (36 3 °C), Max:98 1 °F (36 7 °C)    Temp:  [97 3 °F (36 3 °C)-98 1 °F (36 7 °C)] 97 7 °F (36 5 °C)  HR:  [52-58] 54  Resp:  [18] 18  BP: (108-141)/(60-81) 129/70  SpO2:  [97 %-98 %] 98 %  Body mass index is 29 16 kg/m²  Input and Output Summary (last 24 hours): Intake/Output Summary (Last 24 hours) at 12/11/18 1604  Last data filed at 12/11/18 1242   Gross per 24 hour   Intake          1673 33 ml   Output              900 ml   Net           773 33 ml       Physical Exam:     Physical Exam  General:  NAD, awake, alert, oriented x 3  HEENT:  NC/AT, mucous membranes moist  Neck:  Supple, No JVP elevation  CV:  + S1, + S2, Bradycardia, Regular rhythm  Pulm:  Lung fields are CTA bilaterally  Abd:  Soft, + Right upper quadrant pain with palpation, Non-distended  Ext:  No clubbing/cyanosis/edema  Skin:  No rashes      Additional Data:     Labs:      Results from last 7 days  Lab Units 12/11/18  0536   WBC Thousand/uL 6 90   HEMOGLOBIN g/dL 10 8*   HEMATOCRIT % 34 2*   PLATELETS Thousands/uL 224   NEUTROS PCT % 62   LYMPHS PCT % 30   MONOS PCT % 7   EOS PCT % 1       Results from last 7 days  Lab Units 12/11/18  0536   SODIUM mmol/L 138   POTASSIUM mmol/L 3 7   CHLORIDE mmol/L 105   CO2 mmol/L 28   BUN mg/dL 4*   CREATININE mg/dL 0 51*   ANION GAP mmol/L 5   CALCIUM mg/dL 8 4   ALBUMIN g/dL 3 2   TOTAL BILIRUBIN mg/dL 0 20   ALK PHOS U/L 139*   ALT U/L 88*   AST U/L 73*   GLUCOSE RANDOM mg/dL 79       Results from last 7 days  Lab Units 12/11/18  0536   INR  1 20*                       * I Have Reviewed All Lab Data Listed Above  * Additional Pertinent Lab Tests Reviewed:  [address] Admission Reviewed        Recent Cultures (last 7 days):           Last 24 Hours Medication List:     Current Facility-Administered Medications:  [MAR Hold] cefazolin 2,000 mg Intravenous Q8H Igor Bray MD Last Rate: 2,000 mg (12/11/18 1214)   [MAR Hold] HYDROmorphone 0 5 mg Intravenous Q3H PRN Igor Bray MD    lactated ringers 125 mL/hr Intravenous Continuous Igor Bray MD Last Rate: 125 mL/hr (12/11/18 1452)   [MAR Hold] nicotine 21 mg Transdermal Daily Nissa Chandler MD    [MAR Hold] ondansetron 4 mg Intravenous Q6H PRN Carlos Alberto Wiggins MD    oxyCODONE 5 mg Oral Q4H PRN Krysten Bucio DO    [MAR Hold] pantoprazole 40 mg Intravenous Q24H [address] Carlos Alberto Wiggins MD      Facility-Administered Medications Ordered in Other Encounters:  fentanyl citrate (PF)  Intravenous PRN Ardell Carrier, CRNA   lidocaine   PRN Ardell Carrier, CRNA   midazolam  Intravenous PRN Ardell Carrier, CRNA   ondansetron   PRN Ardell Carrier, CRNA   propofol  Intravenous PRN Ardell Carrier, CRNA   rocuronium   PRN Ardell Carrier, CRNA        Today, Patient Was Seen By: Krysten Bucio DO    ** Please Note: Dictation voice to text software may have been used in the creation of this document   ** blood glucose testing/diet modification/insulin therapy

## 2022-11-03 ENCOUNTER — APPOINTMENT (EMERGENCY)
Dept: CT IMAGING | Facility: HOSPITAL | Age: 27
End: 2022-11-03

## 2022-11-03 ENCOUNTER — HOSPITAL ENCOUNTER (EMERGENCY)
Facility: HOSPITAL | Age: 27
Discharge: HOME | End: 2022-11-03
Attending: EMERGENCY MEDICINE

## 2022-11-03 VITALS
SYSTOLIC BLOOD PRESSURE: 109 MMHG | OXYGEN SATURATION: 97 % | TEMPERATURE: 97.5 F | HEIGHT: 61 IN | RESPIRATION RATE: 20 BRPM | HEART RATE: 89 BPM | WEIGHT: 110 LBS | DIASTOLIC BLOOD PRESSURE: 78 MMHG | BODY MASS INDEX: 20.77 KG/M2

## 2022-11-03 DIAGNOSIS — N39.0 UTI (URINARY TRACT INFECTION): ICD-10-CM

## 2022-11-03 DIAGNOSIS — K56.7 ILEUS (HCC): ICD-10-CM

## 2022-11-03 DIAGNOSIS — R10.9 ABDOMINAL PAIN: Primary | ICD-10-CM

## 2022-11-03 LAB
ALBUMIN SERPL BCP-MCNC: 3.9 G/DL (ref 3.5–5)
ALP SERPL-CCNC: 94 U/L (ref 46–116)
ALT SERPL W P-5'-P-CCNC: 25 U/L (ref 12–78)
ANION GAP SERPL CALCULATED.3IONS-SCNC: 6 MMOL/L (ref 4–13)
APTT PPP: 24 SECONDS (ref 23–37)
AST SERPL W P-5'-P-CCNC: 25 U/L (ref 5–45)
BACTERIA UR QL AUTO: ABNORMAL /HPF
BASOPHILS # BLD AUTO: 0.03 THOUSANDS/ÂΜL (ref 0–0.1)
BASOPHILS NFR BLD AUTO: 0 % (ref 0–1)
BILIRUB SERPL-MCNC: 0.15 MG/DL (ref 0.2–1)
BILIRUB UR QL STRIP: ABNORMAL
BUN SERPL-MCNC: 12 MG/DL (ref 5–25)
CALCIUM SERPL-MCNC: 8.3 MG/DL (ref 8.3–10.1)
CHLORIDE SERPL-SCNC: 101 MMOL/L (ref 96–108)
CLARITY UR: CLEAR
CO2 SERPL-SCNC: 30 MMOL/L (ref 21–32)
COLOR UR: YELLOW
CREAT SERPL-MCNC: 0.66 MG/DL (ref 0.6–1.3)
EOSINOPHIL # BLD AUTO: 0.02 THOUSAND/ÂΜL (ref 0–0.61)
EOSINOPHIL NFR BLD AUTO: 0 % (ref 0–6)
ERYTHROCYTE [DISTWIDTH] IN BLOOD BY AUTOMATED COUNT: 17.5 % (ref 11.6–15.1)
EXT PREG TEST URINE: NEGATIVE
EXT. CONTROL ED NAV: NORMAL
GFR SERPL CREATININE-BSD FRML MDRD: 120 ML/MIN/1.73SQ M
GLUCOSE SERPL-MCNC: 99 MG/DL (ref 65–140)
GLUCOSE UR STRIP-MCNC: NEGATIVE MG/DL
HCT VFR BLD AUTO: 31.2 % (ref 34.8–46.1)
HGB BLD-MCNC: 9 G/DL (ref 11.5–15.4)
HGB UR QL STRIP.AUTO: NEGATIVE
IMM GRANULOCYTES # BLD AUTO: 0.03 THOUSAND/UL (ref 0–0.2)
IMM GRANULOCYTES NFR BLD AUTO: 0 % (ref 0–2)
INR PPP: 1 (ref 0.84–1.19)
KETONES UR STRIP-MCNC: ABNORMAL MG/DL
LACTATE SERPL-SCNC: 1.3 MMOL/L (ref 0.5–2)
LEUKOCYTE ESTERASE UR QL STRIP: NEGATIVE
LIPASE SERPL-CCNC: 119 U/L (ref 73–393)
LYMPHOCYTES # BLD AUTO: 1.49 THOUSANDS/ÂΜL (ref 0.6–4.47)
LYMPHOCYTES NFR BLD AUTO: 19 % (ref 14–44)
MCH RBC QN AUTO: 19.9 PG (ref 26.8–34.3)
MCHC RBC AUTO-ENTMCNC: 28.8 G/DL (ref 31.4–37.4)
MCV RBC AUTO: 69 FL (ref 82–98)
MONOCYTES # BLD AUTO: 0.5 THOUSAND/ÂΜL (ref 0.17–1.22)
MONOCYTES NFR BLD AUTO: 6 % (ref 4–12)
NEUTROPHILS # BLD AUTO: 5.91 THOUSANDS/ÂΜL (ref 1.85–7.62)
NEUTS SEG NFR BLD AUTO: 75 % (ref 43–75)
NITRITE UR QL STRIP: NEGATIVE
NON-SQ EPI CELLS URNS QL MICRO: ABNORMAL /HPF
NRBC BLD AUTO-RTO: 0 /100 WBCS
PH UR STRIP.AUTO: 5.5 [PH]
PLATELET # BLD AUTO: 397 THOUSANDS/UL (ref 149–390)
PMV BLD AUTO: 8.8 FL (ref 8.9–12.7)
POTASSIUM SERPL-SCNC: 4.1 MMOL/L (ref 3.5–5.3)
PROT SERPL-MCNC: 8 G/DL (ref 6.4–8.4)
PROT UR STRIP-MCNC: ABNORMAL MG/DL
PROTHROMBIN TIME: 13.3 SECONDS (ref 11.6–14.5)
RBC # BLD AUTO: 4.53 MILLION/UL (ref 3.81–5.12)
RBC #/AREA URNS AUTO: ABNORMAL /HPF
SODIUM SERPL-SCNC: 137 MMOL/L (ref 135–147)
SP GR UR STRIP.AUTO: >=1.03 (ref 1–1.03)
UROBILINOGEN UR QL STRIP.AUTO: 0.2 E.U./DL
WBC # BLD AUTO: 7.98 THOUSAND/UL (ref 4.31–10.16)
WBC #/AREA URNS AUTO: ABNORMAL /HPF

## 2022-11-03 RX ORDER — ONDANSETRON 2 MG/ML
4 INJECTION INTRAMUSCULAR; INTRAVENOUS ONCE
Status: COMPLETED | OUTPATIENT
Start: 2022-11-03 | End: 2022-11-03

## 2022-11-03 RX ORDER — CEPHALEXIN 500 MG/1
500 CAPSULE ORAL 4 TIMES DAILY
Qty: 20 CAPSULE | Refills: 0 | Status: SHIPPED | OUTPATIENT
Start: 2022-11-03 | End: 2022-11-08

## 2022-11-03 RX ORDER — CEPHALEXIN 250 MG/1
500 CAPSULE ORAL ONCE
Status: COMPLETED | OUTPATIENT
Start: 2022-11-03 | End: 2022-11-03

## 2022-11-03 RX ORDER — HYDROMORPHONE HCL/PF 1 MG/ML
0.5 SYRINGE (ML) INJECTION ONCE
Status: COMPLETED | OUTPATIENT
Start: 2022-11-03 | End: 2022-11-03

## 2022-11-03 RX ORDER — PANTOPRAZOLE SODIUM 40 MG/10ML
40 INJECTION, POWDER, LYOPHILIZED, FOR SOLUTION INTRAVENOUS ONCE
Status: COMPLETED | OUTPATIENT
Start: 2022-11-03 | End: 2022-11-03

## 2022-11-03 RX ORDER — HYDROMORPHONE HCL/PF 1 MG/ML
1 SYRINGE (ML) INJECTION ONCE
Status: COMPLETED | OUTPATIENT
Start: 2022-11-03 | End: 2022-11-03

## 2022-11-03 RX ADMIN — CEPHALEXIN 500 MG: 250 CAPSULE ORAL at 22:15

## 2022-11-03 RX ADMIN — PANTOPRAZOLE SODIUM 40 MG: 40 INJECTION, POWDER, FOR SOLUTION INTRAVENOUS at 20:05

## 2022-11-03 RX ADMIN — IOHEXOL 50 ML: 240 INJECTION, SOLUTION INTRATHECAL; INTRAVASCULAR; INTRAVENOUS; ORAL at 21:23

## 2022-11-03 RX ADMIN — HYDROMORPHONE HYDROCHLORIDE 0.5 MG: 1 INJECTION, SOLUTION INTRAMUSCULAR; INTRAVENOUS; SUBCUTANEOUS at 22:15

## 2022-11-03 RX ADMIN — ONDANSETRON 4 MG: 2 INJECTION INTRAMUSCULAR; INTRAVENOUS at 20:05

## 2022-11-03 RX ADMIN — HYDROMORPHONE HYDROCHLORIDE 1 MG: 1 INJECTION, SOLUTION INTRAMUSCULAR; INTRAVENOUS; SUBCUTANEOUS at 20:05

## 2022-11-03 RX ADMIN — SODIUM CHLORIDE 1000 ML: 0.9 INJECTION, SOLUTION INTRAVENOUS at 20:05

## 2022-11-03 RX ADMIN — IOHEXOL 100 ML: 350 INJECTION, SOLUTION INTRAVENOUS at 21:23

## 2022-11-03 NOTE — ED PROVIDER NOTES
History  Chief Complaint   Patient presents with   • Abdominal Pain     Pt reports upper abd pain with vomiting since 2pm today, she states it feels the same way when she had pancreatitis before  Patient is a 79-year-old female presents the emergency department complaining of epigastric abdominal pain started this afternoon around 2:00 p m  Associated with nausea vomiting patient reports this is similar to when she had pancreatitis in the past   Patient has a history of cholecystectomy and prior John-en-Y gastric bypass  History provided by:  Patient  Abdominal Pain  Pain location:  Epigastric  Pain quality: aching and cramping    Pain severity:  Severe  Onset quality:  Gradual  Duration:  5 hours  Timing:  Constant  Progression:  Worsening  Associated symptoms: nausea and vomiting    Associated symptoms: no chest pain, no chills, no constipation, no cough, no diarrhea, no dysuria, no fatigue, no fever, no hematuria, no shortness of breath and no sore throat        Prior to Admission Medications   Prescriptions Last Dose Informant Patient Reported? Taking?    Ibuprofen-diphenhydrAMINE Cit (ADVIL PM PO) Not Taking at Unknown time  Yes No   Sig: Take 1 tablet by mouth daily at bedtime   Patient not taking: Reported on 11/3/2022   oxyCODONE (ROXICODONE) 5 mg immediate release tablet Not Taking at Unknown time  No No   Sig: Take 1 tablet (5 mg total) by mouth every 4 (four) hours as needed for severe pain Max Daily Amount: 30 mg   Patient not taking: No sig reported      Facility-Administered Medications: None       Past Medical History:   Diagnosis Date   • Low back pain    • Pilonidal cyst        Past Surgical History:   Procedure Laterality Date   • ABDOMINAL SURGERY     • CHOLANGIOGRAM N/A 12/11/2018    Procedure: CHOLANGIOGRAM;  Surgeon: Korin Gay MD;  Location: [address] [place] [address];  Service: General   • CHOLECYSTECTOMY LAPAROSCOPIC N/A 12/11/2018    Procedure: CHOLECYSTECTOMY LAPAROSCOPIC;  Surgeon: Cordelia Velasco Irene Madrigal MD;  Location: [address] [place] [address] OR;  Service: General   • DILATION AND CURETTAGE OF UTERUS     • FL CHOLANGIOGRAM T TUBE  12/11/2018   • ELA-EN-Y PROCEDURE  06/2018       History reviewed  No pertinent family history  I have reviewed and agree with the history as documented  E-Cigarette/Vaping   • E-Cigarette Use Never User      E-Cigarette/Vaping Substances     Social History     Tobacco Use   • Smoking status: Current Every Day Smoker     Packs/day: 0 25     Types: Cigarettes   • Smokeless tobacco: Never Used   • Tobacco comment: Pt reports smoking 4-5 cigarettes a day   Vaping Use   • Vaping Use: Never used   Substance Use Topics   • Alcohol use: No   • Drug use: Never       Review of Systems   Constitutional: Negative for activity change, appetite change, chills, fatigue and fever  HENT: Negative for congestion, ear pain, rhinorrhea and sore throat  Eyes: Negative for discharge, redness and visual disturbance  Respiratory: Negative for cough, chest tightness, shortness of breath and wheezing  Cardiovascular: Negative for chest pain and palpitations  Gastrointestinal: Positive for abdominal pain, nausea and vomiting  Negative for constipation and diarrhea  Endocrine: Negative for polydipsia and polyuria  Genitourinary: Negative for difficulty urinating, dysuria, frequency, hematuria and urgency  Musculoskeletal: Negative for arthralgias and myalgias  Skin: Negative for color change, pallor and rash  Neurological: Negative for dizziness, weakness, light-headedness, numbness and headaches  Hematological: Negative for adenopathy  Does not bruise/bleed easily  All other systems reviewed and are negative  Physical Exam  Physical Exam  Vitals and nursing note reviewed  Constitutional:       Appearance: She is well-developed  HENT:      Head: Normocephalic and atraumatic        Right Ear: External ear normal       Left Ear: External ear normal       Nose: Nose normal    Eyes: Conjunctiva/sclera: Conjunctivae normal       Pupils: Pupils are equal, round, and reactive to light  Cardiovascular:      Rate and Rhythm: Normal rate and regular rhythm  Heart sounds: Normal heart sounds  Pulmonary:      Effort: Pulmonary effort is normal  No respiratory distress  Breath sounds: Normal breath sounds  No wheezing or rales  Chest:      Chest wall: No tenderness  Abdominal:      General: Bowel sounds are normal  There is no distension  Palpations: Abdomen is soft  Tenderness: There is generalized abdominal tenderness and tenderness in the epigastric area  There is guarding  Musculoskeletal:         General: Normal range of motion  Cervical back: Normal range of motion and neck supple  Skin:     General: Skin is warm and dry  Neurological:      Mental Status: She is alert and oriented to person, place, and time  Cranial Nerves: No cranial nerve deficit  Sensory: No sensory deficit           Vital Signs  ED Triage Vitals [11/03/22 1938]   Temperature Pulse Respirations Blood Pressure SpO2   97 5 °F (36 4 °C) 64 19 102/62 100 %      Temp Source Heart Rate Source Patient Position - Orthostatic VS BP Location FiO2 (%)   Temporal Monitor Sitting Right arm --      Pain Score       10 - Worst Possible Pain           Vitals:    11/03/22 1938   BP: 102/62   Pulse: 64   Patient Position - Orthostatic VS: Sitting         Visual Acuity      ED Medications  Medications   cephalexin (KEFLEX) capsule 500 mg (has no administration in time range)   sodium chloride 0 9 % bolus 1,000 mL (0 mL Intravenous Stopped 11/3/22 2154)   ondansetron (ZOFRAN) injection 4 mg (4 mg Intravenous Given 11/3/22 2005)   HYDROmorphone (DILAUDID) injection 1 mg (1 mg Intravenous Given 11/3/22 2005)   pantoprazole (PROTONIX) injection 40 mg (40 mg Intravenous Given 11/3/22 2005)   iohexol (OMNIPAQUE) 350 MG/ML injection (SINGLE-DOSE) 100 mL (100 mL Intravenous Given 11/3/22 2123)   iohexol (OMNIPAQUE) 240 MG/ML solution 50 mL (50 mL Oral Given 11/3/22 2123)       Diagnostic Studies  Results Reviewed     Procedure Component Value Units Date/Time    Lactic acid [[id number]]  (Normal) Collected: 11/03/22 2005    Lab Status: Final result Specimen: Blood from Arm, Left Updated: 11/03/22 2035     LACTIC ACID 1 3 mmol/L     Narrative:      Result may be elevated if tourniquet was used during collection      Comprehensive metabolic panel [[id number]]  (Abnormal) Collected: 11/03/22 2005    Lab Status: Final result Specimen: Blood from Arm, Left Updated: 11/03/22 2032     Sodium 137 mmol/L      Potassium 4 1 mmol/L      Chloride 101 mmol/L      CO2 30 mmol/L      ANION GAP 6 mmol/L      BUN 12 mg/dL      Creatinine 0 66 mg/dL      Glucose 99 mg/dL      Calcium 8 3 mg/dL      AST 25 U/L      ALT 25 U/L      Alkaline Phosphatase 94 U/L      Total Protein 8 0 g/dL      Albumin 3 9 g/dL      Total Bilirubin 0 15 mg/dL      eGFR 120 ml/min/1 73sq m     Narrative:      [place] guidelines for Chronic Kidney Disease (CKD):   •  Stage 1 with normal or high GFR (GFR > 90 mL/min/1 73 square meters)  •  Stage 2 Mild CKD (GFR = 60-89 mL/min/1 73 square meters)  •  Stage 3A Moderate CKD (GFR = 45-59 mL/min/1 73 square meters)  •  Stage 3B Moderate CKD (GFR = 30-44 mL/min/1 73 square meters)  •  Stage 4 Severe CKD (GFR = 15-29 mL/min/1 73 square meters)  •  Stage 5 End Stage CKD (GFR <15 mL/min/1 73 square meters)  Note: GFR calculation is accurate only with a steady state creatinine    Lipase [[id number]]  (Normal) Collected: 11/03/22 2005    Lab Status: Final result Specimen: Blood from Arm, Left Updated: 11/03/22 2032     Lipase 119 u/L     Protime-INR [[id number]]  (Normal) Collected: 11/03/22 2005    Lab Status: Final result Specimen: Blood from Arm, Left Updated: 11/03/22 2026     Protime 13 3 seconds      INR 1 00    APTT [[id number]]  (Normal) Collected: 11/03/22 2005    Lab Status: Final result Specimen: Blood from Arm, Left Updated: 11/03/22 2026     PTT 24 seconds     Urine Microscopic [[id number]]  (Abnormal) Collected: 11/03/22 2005    Lab Status: Final result Specimen: Urine, Clean Catch Updated: 11/03/22 2023     RBC, UA 0-1 /hpf      WBC, UA 2-4 /hpf      Epithelial Cells Moderate /hpf      Bacteria, UA Moderate /hpf     UA w Reflex to Microscopic w Reflex to Culture [[id number]]  (Abnormal) Collected: 11/03/22 2005    Lab Status: Final result Specimen: Urine, Clean Catch Updated: 11/03/22 2015     Color, UA Yellow     Clarity, UA Clear     Specific Gravity, UA >=1 030     pH, UA 5 5     Leukocytes, UA Negative     Nitrite, UA Negative     Protein, UA 30 (1+) mg/dl      Glucose, UA Negative mg/dl      Ketones, UA Trace mg/dl      Urobilinogen, UA 0 2 E U /dl      Bilirubin, UA Small     Occult Blood, UA Negative    CBC and differential [[id number]]  (Abnormal) Collected: 11/03/22 2005    Lab Status: Final result Specimen: Blood from Arm, Left Updated: 11/03/22 2013     WBC 7 98 Thousand/uL      RBC 4 53 Million/uL      Hemoglobin 9 0 g/dL      Hematocrit 31 2 %      MCV 69 fL      MCH 19 9 pg      MCHC 28 8 g/dL      RDW 17 5 %      MPV 8 8 fL      Platelets 022 Thousands/uL      nRBC 0 /100 WBCs      Neutrophils Relative 75 %      Immat GRANS % 0 %      Lymphocytes Relative 19 %      Monocytes Relative 6 %      Eosinophils Relative 0 %      Basophils Relative 0 %      Neutrophils Absolute 5 91 Thousands/µL      Immature Grans Absolute 0 03 Thousand/uL      Lymphocytes Absolute 1 49 Thousands/µL      Monocytes Absolute 0 50 Thousand/µL      Eosinophils Absolute 0 02 Thousand/µL      Basophils Absolute 0 03 Thousands/µL     POCT pregnancy, urine [[id number]]  (Normal) Resulted: 11/03/22 2006    Lab Status: Final result Updated: 11/03/22 2006     EXT PREG TEST UR (Ref: Negative) negative     Control valid                 CT abdomen pelvis with contrast   Final Result by Edwin Nix DO (11/03 2137) Urinary bladder wall thickening likely representing cystitis  Unremarkable appearing pancreas  Biliary duct dilatation considered normal status post cholecystectomy  Somewhat prominent but not dilated small bowel loops, likely related to some degree of ileus  Workstation performed: [id number]                    Procedures  Procedures         ED Course                               SBIRT 20yo+    Flowsheet Row Most Recent Value   SBIRT (25 yo +)    In order to provide better care to our patients, we are screening all of our patients for alcohol and drug use  Would it be okay to ask you these screening questions? Yes Filed at: 11/03/2022 2201   Initial Alcohol Screen: US AUDIT-C     1  How often do you have a drink containing alcohol? 0 Filed at: 11/03/2022 2201   2  How many drinks containing alcohol do you have on a typical day you are drinking? 0 Filed at: 11/03/2022 2201   3a  Male UNDER 65: How often do you have five or more drinks on one occasion? 0 Filed at: 11/03/2022 2201   3b  FEMALE Any Age, or MALE 65+: How often do you have 4 or more drinks on one occassion? 0 Filed at: 11/03/2022 2201   Audit-C Score 0 Filed at: 11/03/2022 2201   SHERWIN: How many times in the past year have you    Used an illegal drug or used a prescription medication for non-medical reasons? Never Filed at: 11/03/2022 2201                    MDM  Number of Diagnoses or Management Options  Abdominal pain: new and requires workup  Ileus [facility]): new and requires workup  UTI (urinary tract infection): new and requires workup  Diagnosis management comments: Patient remained clinically and hemodynamically stable in the emergency department she is afebrile nontoxic well-appearing no evidence of acute intra-abdominal pathology aside from suspected mild ileus and urinary tract infection seen on CT imaging in the ED    Will treat Keflex for UTI advised rest plenty fluids and supportive care prompt follow-up primary physician for further evaluation and treatment and obtain test results  return precautions and anticipatory guidance discussed  Amount and/or Complexity of Data Reviewed  Clinical lab tests: ordered and reviewed  Tests in the radiology section of CPT®: ordered and reviewed  Tests in the medicine section of CPT®: ordered and reviewed  Decide to obtain previous medical records or to obtain history from someone other than the patient: yes  Review and summarize past medical records: yes  Independent visualization of images, tracings, or specimens: yes    Risk of Complications, Morbidity, and/or Mortality  Presenting problems: moderate  Diagnostic procedures: moderate  Management options: moderate    Patient Progress  Patient progress: stable      Disposition  Final diagnoses:   Abdominal pain   Ileus ([address] )   UTI (urinary tract infection)     Time reflects when diagnosis was documented in both MDM as applicable and the Disposition within this note     Time User Action Codes Description Comment    11/3/2022 10:04 PM Lowell Cage Add [R10 9] Abdominal pain     11/3/2022 10:04 PM Kevin Mendez Add [K56 7] Ileus ([facility] [address] )     11/3/2022 10:05 PM Lowell Cage Add [N39 0] UTI (urinary tract infection)       ED Disposition     ED Disposition   Discharge    Condition   Stable    Date/Time   Thu Nov 3, 2022 10:04 PM    Comment   [place] discharge to home/self care                 Follow-up Information     Follow up With Specialties Details Why Contact Info    Lemuel Stout MD Internal Medicine Schedule an appointment as soon as possible for a visit in 3 days  [id number] [facility]  [phone]            Patient's Medications   Discharge Prescriptions    CEPHALEXIN (KEFLEX) 500 MG CAPSULE    Take 1 capsule (500 mg total) by mouth 4 (four) times a day for 5 days       Start Date: 11/3/2022 End Date: 11/8/2022       Order Dose: 500 mg       Quantity: 20 capsule    Refills: 0       No discharge procedures on file     PDMP Review     None          ED Provider  Electronically Signed by           Sadaf Celis DO  11/03/22 6224

## 2022-11-15 ENCOUNTER — TELEPHONE (OUTPATIENT)
Dept: ANESTHESIOLOGY | Facility: CLINIC | Age: 27
End: 2022-11-15

## 2022-11-15 DIAGNOSIS — Z87.891 QUIT SMOKING: Primary | ICD-10-CM

## 2022-11-16 ENCOUNTER — TELEPHONE (OUTPATIENT)
Dept: ANESTHESIOLOGY | Facility: CLINIC | Age: 27
End: 2022-11-16

## 2022-11-18 RX ORDER — FERROUS SULFATE 325(65) MG
325 TABLET ORAL
COMMUNITY

## 2022-11-18 RX ORDER — ACETAMINOPHEN 500 MG
1000 TABLET ORAL EVERY 6 HOURS PRN
COMMUNITY

## 2022-11-21 ENCOUNTER — PATIENT OUTREACH (OUTPATIENT)
Dept: OTHER | Facility: CLINIC | Age: 27
End: 2022-11-21

## 2022-11-25 ENCOUNTER — ANESTHESIA EVENT (OUTPATIENT)
Dept: PERIOP | Facility: HOSPITAL | Age: 27
End: 2022-11-25

## 2022-11-25 ENCOUNTER — ANESTHESIA (OUTPATIENT)
Dept: PERIOP | Facility: HOSPITAL | Age: 27
End: 2022-11-25

## 2022-11-25 ENCOUNTER — HOSPITAL ENCOUNTER (OUTPATIENT)
Facility: HOSPITAL | Age: 27
Setting detail: OUTPATIENT SURGERY
Discharge: HOME | End: 2022-11-25
Attending: STUDENT IN AN ORGANIZED HEALTH CARE EDUCATION/TRAINING PROGRAM | Admitting: STUDENT IN AN ORGANIZED HEALTH CARE EDUCATION/TRAINING PROGRAM

## 2022-11-25 VITALS
DIASTOLIC BLOOD PRESSURE: 55 MMHG | RESPIRATION RATE: 13 BRPM | HEIGHT: 63 IN | WEIGHT: 110 LBS | SYSTOLIC BLOOD PRESSURE: 95 MMHG | HEART RATE: 72 BPM | BODY MASS INDEX: 19.49 KG/M2 | OXYGEN SATURATION: 96 % | TEMPERATURE: 98.2 F

## 2022-11-25 DIAGNOSIS — L05.91 PILONIDAL CYST WITHOUT ABSCESS: ICD-10-CM

## 2022-11-25 LAB
EXT PREGNANCY TEST URINE: NEGATIVE
EXT. CONTROL: NORMAL

## 2022-11-25 RX ORDER — MAGNESIUM HYDROXIDE 1200 MG/15ML
LIQUID ORAL AS NEEDED
Status: DISCONTINUED | OUTPATIENT
Start: 2022-11-25 | End: 2022-11-25 | Stop reason: HOSPADM

## 2022-11-25 RX ORDER — SODIUM CHLORIDE, SODIUM LACTATE, POTASSIUM CHLORIDE, CALCIUM CHLORIDE 600; 310; 30; 20 MG/100ML; MG/100ML; MG/100ML; MG/100ML
INJECTION, SOLUTION INTRAVENOUS CONTINUOUS PRN
Status: DISCONTINUED | OUTPATIENT
Start: 2022-11-25 | End: 2022-11-25

## 2022-11-25 RX ORDER — PROPOFOL 10 MG/ML
INJECTION, EMULSION INTRAVENOUS AS NEEDED
Status: DISCONTINUED | OUTPATIENT
Start: 2022-11-25 | End: 2022-11-25

## 2022-11-25 RX ORDER — EPHEDRINE SULFATE 50 MG/ML
INJECTION INTRAVENOUS AS NEEDED
Status: DISCONTINUED | OUTPATIENT
Start: 2022-11-25 | End: 2022-11-25

## 2022-11-25 RX ORDER — MIDAZOLAM HYDROCHLORIDE 2 MG/2ML
INJECTION, SOLUTION INTRAMUSCULAR; INTRAVENOUS AS NEEDED
Status: DISCONTINUED | OUTPATIENT
Start: 2022-11-25 | End: 2022-11-25

## 2022-11-25 RX ORDER — GLYCOPYRROLATE 0.2 MG/ML
INJECTION INTRAMUSCULAR; INTRAVENOUS AS NEEDED
Status: DISCONTINUED | OUTPATIENT
Start: 2022-11-25 | End: 2022-11-25

## 2022-11-25 RX ORDER — NEOSTIGMINE METHYLSULFATE 1 MG/ML
INJECTION INTRAVENOUS AS NEEDED
Status: DISCONTINUED | OUTPATIENT
Start: 2022-11-25 | End: 2022-11-25

## 2022-11-25 RX ORDER — PHENYLEPHRINE HCL IN 0.9% NACL 1 MG/10 ML
SYRINGE (ML) INTRAVENOUS AS NEEDED
Status: DISCONTINUED | OUTPATIENT
Start: 2022-11-25 | End: 2022-11-25

## 2022-11-25 RX ORDER — FENTANYL CITRATE 50 UG/ML
INJECTION, SOLUTION INTRAMUSCULAR; INTRAVENOUS AS NEEDED
Status: DISCONTINUED | OUTPATIENT
Start: 2022-11-25 | End: 2022-11-25

## 2022-11-25 RX ORDER — SODIUM CHLORIDE, SODIUM LACTATE, POTASSIUM CHLORIDE, CALCIUM CHLORIDE 600; 310; 30; 20 MG/100ML; MG/100ML; MG/100ML; MG/100ML
75 INJECTION, SOLUTION INTRAVENOUS CONTINUOUS
Status: DISCONTINUED | OUTPATIENT
Start: 2022-11-25 | End: 2022-11-25 | Stop reason: HOSPADM

## 2022-11-25 RX ORDER — DEXMEDETOMIDINE HYDROCHLORIDE 100 UG/ML
INJECTION, SOLUTION INTRAVENOUS AS NEEDED
Status: DISCONTINUED | OUTPATIENT
Start: 2022-11-25 | End: 2022-11-25

## 2022-11-25 RX ORDER — DEXAMETHASONE SODIUM PHOSPHATE 10 MG/ML
INJECTION, SOLUTION INTRAMUSCULAR; INTRAVENOUS AS NEEDED
Status: DISCONTINUED | OUTPATIENT
Start: 2022-11-25 | End: 2022-11-25

## 2022-11-25 RX ORDER — LIDOCAINE HYDROCHLORIDE 10 MG/ML
INJECTION, SOLUTION EPIDURAL; INFILTRATION; INTRACAUDAL; PERINEURAL AS NEEDED
Status: DISCONTINUED | OUTPATIENT
Start: 2022-11-25 | End: 2022-11-25

## 2022-11-25 RX ORDER — ONDANSETRON 2 MG/ML
INJECTION INTRAMUSCULAR; INTRAVENOUS AS NEEDED
Status: DISCONTINUED | OUTPATIENT
Start: 2022-11-25 | End: 2022-11-25

## 2022-11-25 RX ORDER — CEFAZOLIN SODIUM 1 G/50ML
1000 SOLUTION INTRAVENOUS ONCE
Status: COMPLETED | OUTPATIENT
Start: 2022-11-25 | End: 2022-11-25

## 2022-11-25 RX ORDER — ACETAMINOPHEN 325 MG/1
975 TABLET ORAL EVERY 6 HOURS PRN
Status: DISCONTINUED | OUTPATIENT
Start: 2022-11-25 | End: 2022-11-25 | Stop reason: HOSPADM

## 2022-11-25 RX ORDER — ROCURONIUM BROMIDE 10 MG/ML
INJECTION, SOLUTION INTRAVENOUS AS NEEDED
Status: DISCONTINUED | OUTPATIENT
Start: 2022-11-25 | End: 2022-11-25

## 2022-11-25 RX ADMIN — Medication 100 MCG: at 07:59

## 2022-11-25 RX ADMIN — NEOSTIGMINE METHYLSULFATE 3 MG: 1 INJECTION INTRAVENOUS at 08:12

## 2022-11-25 RX ADMIN — PROPOFOL 200 MG: 10 INJECTION, EMULSION INTRAVENOUS at 07:29

## 2022-11-25 RX ADMIN — EPHEDRINE SULFATE 5 MG: 50 INJECTION, SOLUTION INTRAVENOUS at 08:20

## 2022-11-25 RX ADMIN — DEXAMETHASONE SODIUM PHOSPHATE 10 MG: 10 INJECTION, SOLUTION INTRAMUSCULAR; INTRAVENOUS at 07:29

## 2022-11-25 RX ADMIN — CEFAZOLIN SODIUM 1000 MG: 1 SOLUTION INTRAVENOUS at 07:34

## 2022-11-25 RX ADMIN — GLYCOPYRROLATE 0.4 MG: 0.2 INJECTION, SOLUTION INTRAMUSCULAR; INTRAVENOUS at 08:12

## 2022-11-25 RX ADMIN — DEXMEDETOMIDINE HYDROCHLORIDE 8 MCG: 100 INJECTION, SOLUTION INTRAVENOUS at 07:33

## 2022-11-25 RX ADMIN — ONDANSETRON 4 MG: 2 INJECTION INTRAMUSCULAR; INTRAVENOUS at 07:29

## 2022-11-25 RX ADMIN — SODIUM CHLORIDE, SODIUM LACTATE, POTASSIUM CHLORIDE, AND CALCIUM CHLORIDE: .6; .31; .03; .02 INJECTION, SOLUTION INTRAVENOUS at 07:15

## 2022-11-25 RX ADMIN — FENTANYL CITRATE 50 MCG: 50 INJECTION INTRAMUSCULAR; INTRAVENOUS at 07:53

## 2022-11-25 RX ADMIN — LIDOCAINE HYDROCHLORIDE 50 MG: 10 INJECTION, SOLUTION EPIDURAL; INFILTRATION; INTRACAUDAL; PERINEURAL at 07:29

## 2022-11-25 RX ADMIN — ROCURONIUM BROMIDE 30 MG: 10 INJECTION, SOLUTION INTRAVENOUS at 07:30

## 2022-11-25 RX ADMIN — DEXMEDETOMIDINE HYDROCHLORIDE 12 MCG: 100 INJECTION, SOLUTION INTRAVENOUS at 07:29

## 2022-11-25 RX ADMIN — FENTANYL CITRATE 50 MCG: 50 INJECTION INTRAMUSCULAR; INTRAVENOUS at 07:29

## 2022-11-25 RX ADMIN — ACETAMINOPHEN 975 MG: 325 TABLET ORAL at 09:08

## 2022-11-25 RX ADMIN — Medication 100 MCG: at 08:08

## 2022-11-25 RX ADMIN — Medication 100 MCG: at 08:05

## 2022-11-25 RX ADMIN — Medication 100 MCG: at 07:41

## 2022-11-25 RX ADMIN — MIDAZOLAM 2 MG: 1 INJECTION INTRAMUSCULAR; INTRAVENOUS at 07:28

## 2022-11-25 NOTE — ANESTHESIA POSTPROCEDURE EVALUATION
Post-Op Assessment Note    CV Status:  Stable    Pain management: satisfactory to patient     Mental Status:  Alert and awake   Hydration Status:  Stable   PONV Controlled:  None   Airway Patency:  Patent  Airway: intubated      Post Op Vitals Reviewed: Yes      Staff: CRNA         No notable events documented      /57 (11/25/22 0830)    Temp 98 2 °F (36 8 °C) (11/25/22 0825)    Pulse 64 (11/25/22 0830)   Resp 16 (11/25/22 0830)    SpO2 97 % (11/25/22 0830)

## 2022-11-25 NOTE — OP NOTE
OPERATIVE REPORT  PATIENT NAME: Eric Tinoco    :  1995  MRN: [id number]  Pt Location: [facility] OR ROOM 01    SURGERY DATE: 2022    Surgeon(s) and Role:     * Rubin Ring DO - Primary    Preop Diagnosis:  Pilonidal cyst without abscess [L05 ]    Post-Op Diagnosis Codes:     * Pilonidal cyst without abscess [L05 ]    Procedure(s) (LRB):  EXCISION PILONIDAL CYST (N/A)    Specimen(s):  ID Type Source Tests Collected by Time Destination   1 : pilonidal cyst Tissue Pilonidal Cyst/Sinus TISSUE EXAM Rubin Ring DO 2022  7:56 AM        Estimated Blood Loss:   Minimal    Drains:  * No LDAs found *    Anesthesia Type:   General    Operative Indications:  Pilonidal cyst without abscess [L]      Operative Findings:  Pilonidal cyst without evidence of infection    Complications:   None    Procedure and Technique:  The patient was brought to the operating room  A time-out was held and the patient identified and procedure verified  General anesthesia was administered  The patient was then carefully placed on the operating room table in prone position  All pressure points were padded  The area of the tailbone and gluteus was prepped and draped in usual sterile fashion using Betadine solution  Local anesthesia using 0 25% Marcaine with epinephrine was infiltrated  An eliptical incision was marked out in such a way that the longitudinal access was slightly off midline and included the pilonidal sinuses and area of abscess cavity  An incision was made using a 15 blade scalpel along this line  Electrocautery was used to carry dissection through the entire depth of subcutaneous tissue  This was completed until the sacral fascia was in countered  One side of the lip to go incision was then elevated free of the underlying fascia using electrocautery  The area was irrigated  Hemostasis was achieved  The tapes holding the gluteus were released    Subcutaneous tissue was closed using 3 0 Vicryl in an interrupted fashion  Skin was then closed using 3 0 nylon in an interrupted vertical mattress fashion  The area was covered with a clean dressing  The patient tolerated the procedure well  At the end the procedure all needle instrument sponge counts were correct x2       I was present for the entire procedure and A physician assistant was required during the procedure for retraction tissue handling,dissection and suturing    Patient Disposition:  PACU         SIGNATURE: Shahana Chow DO  DATE: November 25, 2022  TIME: 8:08 AM

## 2022-11-25 NOTE — INTERVAL H&P NOTE
H&P reviewed  After examining the patient I find no changes in the patients condition since the H&P had been written      Vitals:    11/25/22 0629   BP: 100/56   Pulse: 67   Resp: 18   Temp: 98 1 °F (36 7 °C)   SpO2: 100%

## 2022-11-25 NOTE — ANESTHESIA POSTPROCEDURE EVALUATION
Post-Op Assessment Note             Reason for prolonged intubation > 24 hours:  N/aReason for prolonged intubation > 48 hours:  N/a      No notable events documented      BP      Temp      Pulse     Resp      SpO2

## 2022-11-25 NOTE — ANESTHESIA PREPROCEDURE EVALUATION
Procedure:  EXCISION PILONIDAL CYST (Buttocks)    Relevant Problems   GI/HEPATIC   (+) Fatty infiltration of liver      HEMATOLOGY   (+) Anemia        Physical Exam    Airway    Mallampati score: II         Dental       Cardiovascular  Cardiovascular exam normal    Pulmonary  Pulmonary exam normal     Other Findings        Anesthesia Plan  ASA Score- 2     Anesthesia Type- general with ASA Monitors  Additional Monitors:   Airway Plan: ETT  Plan Factors-Exercise tolerance (METS): >4 METS  Chart reviewed  Existing labs reviewed  Patient summary reviewed  Patient is a current smoker  Patient not instructed to abstain from smoking on day of procedure  Patient smoked on day of surgery  Induction- intravenous  Postoperative Plan- Plan for postoperative opioid use  Planned trial extubation    Informed Consent- Anesthetic plan and risks discussed with patient  I personally reviewed this patient with the CRNA  Discussed and agreed on the Anesthesia Plan with the CRNA  Matty See is a 28 y o  smoker  presenting today for excision of pilondial cyst    NPO since last night 10 PM  Took following medications: none (as noted in chart)  Discussed risk and benefits of general which include and are not limited to: MI, stroke, sore throat, PONV, post- op pain  No new symptoms of  CP, SOB, F, chills, N/V, dizziness, numbness or tingling, cough, and other symptoms except as noted  AQA  Consented  History of anesthesia:  no personal issues/family issues

## 2022-11-25 NOTE — DISCHARGE INSTRUCTIONS
Pilonidal Cyst     DISCHARGE INSTRUCTIONS:    Remove the bandage tomorrow, replace as needed or wear a pad for drainage  You may shower and soak in water as needed  Clear/blood tinged drainage is normal  You may walk and go up steps  Do not lift, push, or pull more then 10lb until sutures are removed  Avoid siting or standing for long periods of time until sutures removed  You should have a follow up appointment in approximately 2 weeks    Contact your healthcare provider at [phone] if: You have a fever  The incision has surrounding redness    Your cyst has pus draining from it  You have questions or concerns about your condition or care

## 2023-02-22 ENCOUNTER — HOSPITAL ENCOUNTER (EMERGENCY)
Facility: HOSPITAL | Age: 28
Discharge: HOME | End: 2023-02-22
Attending: EMERGENCY MEDICINE

## 2023-02-22 ENCOUNTER — APPOINTMENT (EMERGENCY)
Dept: CT IMAGING | Facility: HOSPITAL | Age: 28
End: 2023-02-22

## 2023-02-22 VITALS
HEART RATE: 80 BPM | WEIGHT: 116.84 LBS | HEIGHT: 63 IN | TEMPERATURE: 97.7 F | OXYGEN SATURATION: 99 % | SYSTOLIC BLOOD PRESSURE: 102 MMHG | RESPIRATION RATE: 18 BRPM | BODY MASS INDEX: 20.7 KG/M2 | DIASTOLIC BLOOD PRESSURE: 68 MMHG

## 2023-02-22 DIAGNOSIS — S06.0X0A CONCUSSION WITHOUT LOSS OF CONSCIOUSNESS, INITIAL ENCOUNTER: Primary | ICD-10-CM

## 2023-02-22 RX ORDER — ONDANSETRON HYDROCHLORIDE 4 MG/5ML
4 SOLUTION ORAL ONCE
Status: COMPLETED | OUTPATIENT
Start: 2023-02-22 | End: 2023-02-22

## 2023-02-22 RX ORDER — ACETAMINOPHEN 325 MG/1
650 TABLET ORAL ONCE
Status: COMPLETED | OUTPATIENT
Start: 2023-02-22 | End: 2023-02-22

## 2023-02-22 RX ORDER — KETOROLAC TROMETHAMINE 30 MG/ML
15 INJECTION, SOLUTION INTRAMUSCULAR; INTRAVENOUS ONCE
Status: COMPLETED | OUTPATIENT
Start: 2023-02-22 | End: 2023-02-22

## 2023-02-22 RX ADMIN — KETOROLAC TROMETHAMINE 15 MG: 30 INJECTION, SOLUTION INTRAMUSCULAR at 16:49

## 2023-02-22 RX ADMIN — ONDANSETRON HYDROCHLORIDE 4 MG: 4 SOLUTION ORAL at 16:50

## 2023-02-22 RX ADMIN — ACETAMINOPHEN 650 MG: 325 TABLET ORAL at 16:49

## 2023-02-22 NOTE — Clinical Note
Esdras De Oliveira was seen and treated in our emergency department on 2/22/2023  Diagnosis:     Cheko Pierce  may return to work on return date  She may return on this date: 02/24/2023         If you have any questions or concerns, please don't hesitate to call        Remington Angel, DO    ______________________________           _______________          _______________  Hospital Representative                              Date                                Time

## 2023-02-22 NOTE — DISCHARGE INSTRUCTIONS
Please follow-up with concussion program   Follow-up with your family doctor  Get plenty of rest and fluids  Alternate between Tylenol and ibuprofen as needed    Return with new or worsening symptoms

## 2024-02-04 ENCOUNTER — HOSPITAL ENCOUNTER (EMERGENCY)
Facility: HOSPITAL | Age: 29
Discharge: HOME | End: 2024-02-04
Attending: STUDENT IN AN ORGANIZED HEALTH CARE EDUCATION/TRAINING PROGRAM | Admitting: STUDENT IN AN ORGANIZED HEALTH CARE EDUCATION/TRAINING PROGRAM
Payer: COMMERCIAL

## 2024-02-04 ENCOUNTER — APPOINTMENT (EMERGENCY)
Dept: CT IMAGING | Facility: HOSPITAL | Age: 29
End: 2024-02-04
Payer: COMMERCIAL

## 2024-02-04 VITALS
SYSTOLIC BLOOD PRESSURE: 110 MMHG | TEMPERATURE: 98.7 F | DIASTOLIC BLOOD PRESSURE: 80 MMHG | RESPIRATION RATE: 14 BRPM | HEART RATE: 90 BPM | OXYGEN SATURATION: 98 %

## 2024-02-04 DIAGNOSIS — R10.11 RIGHT UPPER QUADRANT ABDOMINAL PAIN: Primary | ICD-10-CM

## 2024-02-04 LAB
ALBUMIN SERPL BCP-MCNC: 4.4 G/DL (ref 3.5–5)
ALP SERPL-CCNC: 88 U/L (ref 34–104)
ALT SERPL W P-5'-P-CCNC: 13 U/L (ref 7–52)
ANION GAP SERPL CALCULATED.3IONS-SCNC: 5 MMOL/L
AST SERPL W P-5'-P-CCNC: 21 U/L (ref 13–39)
BASOPHILS # BLD AUTO: 0.03 THOUSANDS/ÂΜL (ref 0–0.1)
BASOPHILS NFR BLD AUTO: 1 % (ref 0–1)
BILIRUB SERPL-MCNC: 0.21 MG/DL (ref 0.2–1)
BILIRUB UR QL STRIP: NEGATIVE
BUN SERPL-MCNC: 9 MG/DL (ref 5–25)
CALCIUM SERPL-MCNC: 9.7 MG/DL (ref 8.4–10.2)
CHLORIDE SERPL-SCNC: 103 MMOL/L (ref 96–108)
CLARITY UR: CLEAR
CO2 SERPL-SCNC: 28 MMOL/L (ref 21–32)
COLOR UR: YELLOW
CREAT SERPL-MCNC: 0.61 MG/DL (ref 0.6–1.3)
EOSINOPHIL # BLD AUTO: 0.06 THOUSAND/ÂΜL (ref 0–0.61)
EOSINOPHIL NFR BLD AUTO: 1 % (ref 0–6)
ERYTHROCYTE [DISTWIDTH] IN BLOOD BY AUTOMATED COUNT: 18.3 % (ref 11.6–15.1)
EXT PREGNANCY TEST URINE: NEGATIVE
EXT. CONTROL: NORMAL
GFR SERPL CREATININE-BSD FRML MDRD: 122 ML/MIN/1.73SQ M
GLUCOSE SERPL-MCNC: 97 MG/DL (ref 65–140)
GLUCOSE UR STRIP-MCNC: NEGATIVE MG/DL
HCT VFR BLD AUTO: 30.2 % (ref 34.8–46.1)
HGB BLD-MCNC: 8.4 G/DL (ref 11.5–15.4)
HGB UR QL STRIP.AUTO: NEGATIVE
IMM GRANULOCYTES # BLD AUTO: 0.01 THOUSAND/UL (ref 0–0.2)
IMM GRANULOCYTES NFR BLD AUTO: 0 % (ref 0–2)
KETONES UR STRIP-MCNC: NEGATIVE MG/DL
LACTATE SERPL-SCNC: 1.1 MMOL/L (ref 0.5–2)
LEUKOCYTE ESTERASE UR QL STRIP: NEGATIVE
LIPASE SERPL-CCNC: 33 U/L (ref 11–82)
LYMPHOCYTES # BLD AUTO: 2.3 THOUSANDS/ÂΜL (ref 0.6–4.47)
LYMPHOCYTES NFR BLD AUTO: 40 % (ref 14–44)
MCH RBC QN AUTO: 18.3 PG (ref 26.8–34.3)
MCHC RBC AUTO-ENTMCNC: 27.8 G/DL (ref 31.4–37.4)
MCV RBC AUTO: 66 FL (ref 82–98)
MONOCYTES # BLD AUTO: 0.53 THOUSAND/ÂΜL (ref 0.17–1.22)
MONOCYTES NFR BLD AUTO: 9 % (ref 4–12)
NEUTROPHILS # BLD AUTO: 2.76 THOUSANDS/ÂΜL (ref 1.85–7.62)
NEUTS SEG NFR BLD AUTO: 49 % (ref 43–75)
NITRITE UR QL STRIP: NEGATIVE
NRBC BLD AUTO-RTO: 0 /100 WBCS
PH UR STRIP.AUTO: 6 [PH]
PLATELET # BLD AUTO: 507 THOUSANDS/UL (ref 149–390)
PMV BLD AUTO: 8.3 FL (ref 8.9–12.7)
POTASSIUM SERPL-SCNC: 4.2 MMOL/L (ref 3.5–5.3)
PROT SERPL-MCNC: 7.9 G/DL (ref 6.4–8.4)
PROT UR STRIP-MCNC: NEGATIVE MG/DL
RBC # BLD AUTO: 4.6 MILLION/UL (ref 3.81–5.12)
SODIUM SERPL-SCNC: 136 MMOL/L (ref 135–147)
SP GR UR STRIP.AUTO: 1.01 (ref 1–1.03)
UROBILINOGEN UR QL STRIP.AUTO: 0.2 E.U./DL
WBC # BLD AUTO: 5.69 THOUSAND/UL (ref 4.31–10.16)

## 2024-02-04 PROCEDURE — 83690 ASSAY OF LIPASE: CPT | Performed by: PHYSICIAN ASSISTANT

## 2024-02-04 PROCEDURE — G1004 CDSM NDSC: HCPCS

## 2024-02-04 PROCEDURE — 81003 URINALYSIS AUTO W/O SCOPE: CPT | Performed by: PHYSICIAN ASSISTANT

## 2024-02-04 PROCEDURE — 85025 COMPLETE CBC W/AUTO DIFF WBC: CPT | Performed by: PHYSICIAN ASSISTANT

## 2024-02-04 PROCEDURE — 96374 THER/PROPH/DIAG INJ IV PUSH: CPT

## 2024-02-04 PROCEDURE — 81025 URINE PREGNANCY TEST: CPT | Performed by: PHYSICIAN ASSISTANT

## 2024-02-04 PROCEDURE — 83605 ASSAY OF LACTIC ACID: CPT | Performed by: PHYSICIAN ASSISTANT

## 2024-02-04 PROCEDURE — 74177 CT ABD & PELVIS W/CONTRAST: CPT

## 2024-02-04 PROCEDURE — 36415 COLL VENOUS BLD VENIPUNCTURE: CPT | Performed by: PHYSICIAN ASSISTANT

## 2024-02-04 PROCEDURE — 99284 EMERGENCY DEPT VISIT MOD MDM: CPT

## 2024-02-04 PROCEDURE — 80053 COMPREHEN METABOLIC PANEL: CPT | Performed by: PHYSICIAN ASSISTANT

## 2024-02-04 PROCEDURE — 99285 EMERGENCY DEPT VISIT HI MDM: CPT | Performed by: PHYSICIAN ASSISTANT

## 2024-02-04 RX ORDER — KETOROLAC TROMETHAMINE 30 MG/ML
15 INJECTION, SOLUTION INTRAMUSCULAR; INTRAVENOUS ONCE
Status: COMPLETED | OUTPATIENT
Start: 2024-02-04 | End: 2024-02-04

## 2024-02-04 RX ADMIN — IOHEXOL 100 ML: 350 INJECTION, SOLUTION INTRAVENOUS at 16:51

## 2024-02-04 RX ADMIN — KETOROLAC TROMETHAMINE 15 MG: 30 INJECTION, SOLUTION INTRAMUSCULAR at 16:42

## 2024-02-04 NOTE — ED PROVIDER NOTES
History  Chief Complaint   Patient presents with    Abdominal Pain     Pt states R upper abdominal pain with breathing that she has had for a month but is worse today. States hx of pancreatitis.     28-year-old female presents to the emergency department for evaluation of right upper quadrant abdominal pain states this has been intermittent for several months.  With past medical history of chronic anemia, anxiety, fatty liver, pancreatitis, choledocholithiasis resulting in cholecystectomy.  Seems to be worse today. States pain worsens with certain movements and deep breathing. Denies feeling short of breath. States pain occasionally worsens with certain foods.  She denies nausea or vomiting.  No diarrhea or constipation.  Denies chance of pregnancy.  Denies dysuria hematuria urinary frequency.  Not currently following with GI.        Prior to Admission Medications   Prescriptions Last Dose Informant Patient Reported? Taking?   Ibuprofen-diphenhydrAMINE Cit (ADVIL PM PO)   Yes No   Sig: Take 1 tablet by mouth daily at bedtime   Patient not taking: Reported on 11/3/2022   acetaminophen (TYLENOL) 500 mg tablet   Yes No   Sig: Take 1,000 mg by mouth every 6 (six) hours as needed for mild pain   Patient not taking: Reported on 2023   ferrous sulfate 325 (65 Fe) mg tablet   Yes No   Sig: Take 325 mg by mouth daily with breakfast   Patient not taking: Reported on 2023      Facility-Administered Medications: None       Past Medical History:   Diagnosis Date    Anemia     chronic-    Anxiety     Fatty liver     History of transfusion     Low back pain     Pancreatitis     Pilonidal cyst        Past Surgical History:   Procedure Laterality Date    ABDOMINAL SURGERY       SECTION      CHOLANGIOGRAM N/A 2018    Procedure: CHOLANGIOGRAM;  Surgeon: Bran Williamson MD;  Location:  MAIN OR;  Service: General    CHOLECYSTECTOMY LAPAROSCOPIC N/A 2018    Procedure: CHOLECYSTECTOMY LAPAROSCOPIC;  Surgeon:  Bran Williamson MD;  Location:  MAIN OR;  Service: General    DILATION AND CURETTAGE OF UTERUS      FL CHOLANGIOGRAM T TUBE  12/11/2018    KS EXCISION PILONIDAL CYST/SINUS COMPLICATED N/A 11/25/2022    Procedure: EXCISION PILONIDAL CYST;  Surgeon: Leighann Gomez DO;  Location:  MAIN OR;  Service: General    ELA-EN-Y PROCEDURE  06/2018       History reviewed. No pertinent family history.  I have reviewed and agree with the history as documented.    E-Cigarette/Vaping    E-Cigarette Use Never User      E-Cigarette/Vaping Substances     Social History     Tobacco Use    Smoking status: Every Day     Current packs/day: 0.25     Types: Cigarettes    Smokeless tobacco: Never    Tobacco comments:     Pt reports smoking 4-5 cigarettes a day   Vaping Use    Vaping status: Never Used   Substance Use Topics    Alcohol use: No    Drug use: Never       Review of Systems   Constitutional:  Negative for appetite change, fatigue and fever.   Respiratory: Negative.     Cardiovascular: Negative.    Gastrointestinal:  Positive for abdominal pain. Negative for constipation, diarrhea, nausea and vomiting.   Musculoskeletal: Negative.    Skin: Negative.    Neurological: Negative.    All other systems reviewed and are negative.      Physical Exam  Physical Exam  Vitals and nursing note reviewed.   Constitutional:       General: She is not in acute distress.     Appearance: She is well-developed. She is not ill-appearing, toxic-appearing or diaphoretic.   HENT:      Head: Normocephalic.      Mouth/Throat:      Mouth: Mucous membranes are moist.   Eyes:      Extraocular Movements: Extraocular movements intact.      Pupils: Pupils are equal, round, and reactive to light.   Cardiovascular:      Rate and Rhythm: Normal rate and regular rhythm.   Pulmonary:      Effort: Pulmonary effort is normal.      Breath sounds: Normal breath sounds. No stridor. No wheezing, rhonchi or rales.   Abdominal:      General: Abdomen is flat. Bowel  sounds are normal.      Tenderness: There is abdominal tenderness in the right upper quadrant and epigastric area. There is no right CVA tenderness or left CVA tenderness.   Skin:     General: Skin is warm and dry.   Neurological:      General: No focal deficit present.      Mental Status: She is alert and oriented to person, place, and time.   Psychiatric:         Mood and Affect: Mood normal.         Vital Signs  ED Triage Vitals [02/04/24 1558]   Temperature Pulse Respirations Blood Pressure SpO2   98.7 °F (37.1 °C) (!) 110 16 107/69 99 %      Temp Source Heart Rate Source Patient Position - Orthostatic VS BP Location FiO2 (%)   Temporal Monitor -- -- --      Pain Score       8           Vitals:    02/04/24 1558 02/04/24 1700   BP: 107/69 110/80   Pulse: (!) 110 90         Visual Acuity      ED Medications  Medications   ketorolac (TORADOL) injection 15 mg (15 mg Intravenous Given 2/4/24 1642)   iohexol (OMNIPAQUE) 350 MG/ML injection (MULTI-DOSE) 100 mL (100 mL Intravenous Given 2/4/24 1651)       Diagnostic Studies  Results Reviewed       Procedure Component Value Units Date/Time    Lactic acid, plasma (w/reflex if result > 2.0) [[id number]]  (Normal) Collected: 02/04/24 1620    Lab Status: Final result Specimen: Blood from Arm, Right Updated: 02/04/24 1642     LACTIC ACID 1.1 mmol/L     Narrative:      Result may be elevated if tourniquet was used during collection.    Comprehensive metabolic panel [[id number]] Collected: 02/04/24 1620    Lab Status: Final result Specimen: Blood from Arm, Right Updated: 02/04/24 1642     Sodium 136 mmol/L      Potassium 4.2 mmol/L      Chloride 103 mmol/L      CO2 28 mmol/L      ANION GAP 5 mmol/L      BUN 9 mg/dL      Creatinine 0.61 mg/dL      Glucose 97 mg/dL      Calcium 9.7 mg/dL      AST 21 U/L      ALT 13 U/L      Alkaline Phosphatase 88 U/L      Total Protein 7.9 g/dL      Albumin 4.4 g/dL      Total Bilirubin 0.21 mg/dL      eGFR 122 ml/min/1.73sq m     Narrative:       [employer] guidelines for Chronic Kidney Disease (CKD):     Stage 1 with normal or high GFR (GFR > 90 mL/min/1.73 square meters)    Stage 2 Mild CKD (GFR = 60-89 mL/min/1.73 square meters)    Stage 3A Moderate CKD (GFR = 45-59 mL/min/1.73 square meters)    Stage 3B Moderate CKD (GFR = 30-44 mL/min/1.73 square meters)    Stage 4 Severe CKD (GFR = 15-29 mL/min/1.73 square meters)    Stage 5 End Stage CKD (GFR <15 mL/min/1.73 square meters)  Note: GFR calculation is accurate only with a steady state creatinine    Lipase [[id number]]  (Normal) Collected: 02/04/24 1620    Lab Status: Final result Specimen: Blood from Arm, Right Updated: 02/04/24 1642     Lipase 33 u/L     UA (URINE) with reflex to Scope [[id number]] Collected: 02/04/24 1620    Lab Status: Final result Specimen: Urine, Clean Catch Updated: 02/04/24 1631     Color, UA Yellow     Clarity, UA Clear     Specific Gravity, UA 1.015     pH, UA 6.0     Leukocytes, UA Negative     Nitrite, UA Negative     Protein, UA Negative mg/dl      Glucose, UA Negative mg/dl      Ketones, UA Negative mg/dl      Urobilinogen, UA 0.2 E.U./dl      Bilirubin, UA Negative     Occult Blood, UA Negative    CBC and differential [[id number]]  (Abnormal) Collected: 02/04/24 1620    Lab Status: Final result Specimen: Blood from Arm, Right Updated: 02/04/24 1626     WBC 5.69 Thousand/uL      RBC 4.60 Million/uL      Hemoglobin 8.4 g/dL      Hematocrit 30.2 %      MCV 66 fL      MCH 18.3 pg      MCHC 27.8 g/dL      RDW 18.3 %      MPV 8.3 fL      Platelets 507 Thousands/uL      nRBC 0 /100 WBCs      Neutrophils Relative 49 %      Immat GRANS % 0 %      Lymphocytes Relative 40 %      Monocytes Relative 9 %      Eosinophils Relative 1 %      Basophils Relative 1 %      Neutrophils Absolute 2.76 Thousands/µL      Immature Grans Absolute 0.01 Thousand/uL      Lymphocytes Absolute 2.30 Thousands/µL      Monocytes Absolute 0.53 Thousand/µL      Eosinophils Absolute 0.06  Thousand/µL      Basophils Absolute 0.03 Thousands/µL     POCT pregnancy, urine [[id number]]  (Normal) Resulted: 02/04/24 1621    Lab Status: Final result Updated: 02/04/24 1622     EXT Preg Test, Ur Negative     Control Valid                   CT abdomen pelvis with contrast   Final Result by Enrique Mcwilliams MD (02/04 1805)      Diffuse bladder wall thickening. Correlate with urinalysis for cystitis.      Prominent endometrial thickness. Right adnexal follicle. Correlate with menstrual cycle.         Workstation performed: [id number]                    Procedures  Procedures         ED Course  ED Course as of 02/04/24 1941   Sun Feb 04, 2024   1630 PREGNANCY TEST URINE: Negative   1720 Hemoglobin(!): 8.4  Patient reports history of anemia.  She denies any black stools.  She will have this rechecked by PCP   1720 WBC: 5.69   1720 UA negative for UTI   1720 LACTIC ACID: 1.1   1720 CMP unremarkable   1720 States she is feeling improved   1812 CT abd: Diffuse bladder wall thickening. Correlate with urinalysis for cystitis.     Prominent endometrial thickness. Right adnexal follicle. Correlate with menstrual cycle.                                 SBIRT 22yo+      Flowsheet Row Most Recent Value   Initial Alcohol Screen: US AUDIT-C     1. How often do you have a drink containing alcohol? 0 Filed at: 02/04/2024 1600   2. How many drinks containing alcohol do you have on a typical day you are drinking?  0 Filed at: 02/04/2024 1600   3b. FEMALE Any Age, or MALE 65+: How often do you have 4 or more drinks on one occassion? 0 Filed at: 02/04/2024 1600   Audit-C Score 0 Filed at: 02/04/2024 1600   SHERWIN: How many times in the past year have you...    Used an illegal drug or used a prescription medication for non-medical reasons? Never Filed at: 02/04/2024 1600                      Medical Decision Making  28-year-old female presenting to the emergency department for evaluation of upper abdominal pain.  Vitals and medical  record reviewed.  Patient at risk for the following but not limited to GERD, gastritis, pancreatitis, enteritis.  Patient's laboratory findings were unremarkable.  Her CT was noted for bladder wall thickening however UA negative for UTI and patient denied any urinary symptoms.  Also noted for endometrial thickening which we did discuss and patient will follow-up with gynecology.  We discussed symptomatic treatment in the meantime patient did have improvement of symptoms with Toradol.  She will follow-up with GI.  We discussed return precautions and patient verbalized understanding.  She is clinically and hemodynamically stable for discharge    Problems Addressed:  Right upper quadrant abdominal pain: acute illness or injury    Amount and/or Complexity of Data Reviewed  Labs: ordered. Decision-making details documented in ED Course.  Radiology: ordered.    Risk  Prescription drug management.             Disposition  Final diagnoses:   Right upper quadrant abdominal pain     Time reflects when diagnosis was documented in both MDM as applicable and the Disposition within this note       Time User Action Codes Description Comment    2/4/2024  6:13 PM Rosie Brock Add [R10.11] Right upper quadrant abdominal pain           ED Disposition       ED Disposition   Discharge    Condition   Stable    Date/Time   Sun Feb 4, 2024 1812    Comment   Analilia Mathew discharge to home/self care.                   Follow-up Information       Follow up With Specialties Details Why Contact Info    Mary Cui MD Internal Medicine   [address] [facility] [address]  [phone]      Cliare Hall MD Gastroenterology   [address] [facility] [address]  [phone]              Discharge Medication List as of 2/4/2024  6:13 PM        CONTINUE these medications which have NOT CHANGED    Details   acetaminophen (TYLENOL) 500 mg tablet Take 1,000 mg by mouth every 6 (six) hours as needed for mild  pain, Historical Med      ferrous sulfate 325 (65 Fe) mg tablet Take 325 mg by mouth daily with breakfast, Historical Med      Ibuprofen-diphenhydrAMINE Cit (ADVIL PM PO) Take 1 tablet by mouth daily at bedtime, Historical Med             No discharge procedures on file.    PDMP Review       None            ED Provider  Electronically Signed by             Rosie Brock PA-C  02/04/24 1911

## 2024-02-04 NOTE — DISCHARGE INSTRUCTIONS
Trial a bland diet.  Small frequent meals.  Follow-up with her GI specialist.  Alternate between Tylenol and Motrin as needed.  Follow-up with your family doctor and return with new or worsening symptoms  CT abdomen pelvis with contrast   Final Result      Diffuse bladder wall thickening. Correlate with urinalysis for cystitis.      Prominent endometrial thickness. Right adnexal follicle. Correlate with menstrual cycle.         Workstation performed: [id number]

## 2024-06-06 DIAGNOSIS — G44.52 NEW DAILY PERSISTENT HEADACHE (NDPH): ICD-10-CM

## 2024-06-19 ENCOUNTER — HOSPITAL ENCOUNTER (OUTPATIENT)
Dept: MRI IMAGING | Facility: HOSPITAL | Age: 29
Discharge: HOME | End: 2024-06-19
Payer: COMMERCIAL

## 2024-06-19 DIAGNOSIS — G44.52 NEW DAILY PERSISTENT HEADACHE (NDPH): ICD-10-CM

## 2024-06-19 PROCEDURE — 70551 MRI BRAIN STEM W/O DYE: CPT

## 2024-07-16 ENCOUNTER — APPOINTMENT (EMERGENCY)
Dept: CT IMAGING | Facility: HOSPITAL | Age: 29
End: 2024-07-16
Payer: COMMERCIAL

## 2024-07-16 ENCOUNTER — HOSPITAL ENCOUNTER (OUTPATIENT)
Facility: HOSPITAL | Age: 29
Setting detail: OBSERVATION
Discharge: HOME | End: 2024-07-17
Attending: EMERGENCY MEDICINE | Admitting: FAMILY MEDICINE
Payer: COMMERCIAL

## 2024-07-16 DIAGNOSIS — G44.89 OTHER HEADACHE SYNDROME: ICD-10-CM

## 2024-07-16 DIAGNOSIS — D64.9 ANEMIA: Primary | ICD-10-CM

## 2024-07-16 DIAGNOSIS — E16.2 HYPOGLYCEMIA: ICD-10-CM

## 2024-07-16 LAB
ANION GAP SERPL CALCULATED.3IONS-SCNC: 4 MMOL/L (ref 4–13)
BASOPHILS # BLD AUTO: 0.04 THOUSANDS/ÂΜL (ref 0–0.1)
BASOPHILS NFR BLD AUTO: 1 % (ref 0–1)
BUN SERPL-MCNC: 9 MG/DL (ref 5–25)
CALCIUM SERPL-MCNC: 8.7 MG/DL (ref 8.4–10.2)
CHLORIDE SERPL-SCNC: 108 MMOL/L (ref 96–108)
CO2 SERPL-SCNC: 25 MMOL/L (ref 21–32)
CREAT SERPL-MCNC: 0.61 MG/DL (ref 0.6–1.3)
EOSINOPHIL # BLD AUTO: 0.05 THOUSAND/ÂΜL (ref 0–0.61)
EOSINOPHIL NFR BLD AUTO: 1 % (ref 0–6)
ERYTHROCYTE [DISTWIDTH] IN BLOOD BY AUTOMATED COUNT: 18.9 % (ref 11.6–15.1)
ERYTHROCYTE [SEDIMENTATION RATE] IN BLOOD: 51 MM/HOUR (ref 0–19)
EXT PREGNANCY TEST URINE: NEGATIVE
EXT. CONTROL: NORMAL
GFR SERPL CREATININE-BSD FRML MDRD: 122 ML/MIN/1.73SQ M
GLUCOSE SERPL-MCNC: 169 MG/DL (ref 65–140)
GLUCOSE SERPL-MCNC: 38 MG/DL (ref 65–140)
GLUCOSE SERPL-MCNC: 41 MG/DL (ref 65–140)
GLUCOSE SERPL-MCNC: 73 MG/DL (ref 65–140)
GLUCOSE SERPL-MCNC: 75 MG/DL (ref 65–140)
HCT VFR BLD AUTO: 25.3 % (ref 34.8–46.1)
HGB BLD-MCNC: 7 G/DL (ref 11.5–15.4)
IMM GRANULOCYTES # BLD AUTO: 0.02 THOUSAND/UL (ref 0–0.2)
IMM GRANULOCYTES NFR BLD AUTO: 0 % (ref 0–2)
LYMPHOCYTES # BLD AUTO: 2.21 THOUSANDS/ÂΜL (ref 0.6–4.47)
LYMPHOCYTES NFR BLD AUTO: 30 % (ref 14–44)
MCH RBC QN AUTO: 17.5 PG (ref 26.8–34.3)
MCHC RBC AUTO-ENTMCNC: 27.7 G/DL (ref 31.4–37.4)
MCV RBC AUTO: 63 FL (ref 82–98)
MONOCYTES # BLD AUTO: 0.86 THOUSAND/ÂΜL (ref 0.17–1.22)
MONOCYTES NFR BLD AUTO: 12 % (ref 4–12)
NEUTROPHILS # BLD AUTO: 4.23 THOUSANDS/ÂΜL (ref 1.85–7.62)
NEUTS SEG NFR BLD AUTO: 56 % (ref 43–75)
NRBC BLD AUTO-RTO: 0 /100 WBCS
PLATELET # BLD AUTO: 461 THOUSANDS/UL (ref 149–390)
PMV BLD AUTO: 9 FL (ref 8.9–12.7)
POTASSIUM SERPL-SCNC: 4.2 MMOL/L (ref 3.5–5.3)
RBC # BLD AUTO: 4 MILLION/UL (ref 3.81–5.12)
SODIUM SERPL-SCNC: 137 MMOL/L (ref 135–147)
WBC # BLD AUTO: 7.41 THOUSAND/UL (ref 4.31–10.16)

## 2024-07-16 PROCEDURE — 80048 BASIC METABOLIC PNL TOTAL CA: CPT | Performed by: EMERGENCY MEDICINE

## 2024-07-16 PROCEDURE — 85652 RBC SED RATE AUTOMATED: CPT | Performed by: EMERGENCY MEDICINE

## 2024-07-16 PROCEDURE — 81025 URINE PREGNANCY TEST: CPT | Performed by: EMERGENCY MEDICINE

## 2024-07-16 PROCEDURE — 96375 TX/PRO/DX INJ NEW DRUG ADDON: CPT

## 2024-07-16 PROCEDURE — 96361 HYDRATE IV INFUSION ADD-ON: CPT

## 2024-07-16 PROCEDURE — 70496 CT ANGIOGRAPHY HEAD: CPT

## 2024-07-16 PROCEDURE — 99285 EMERGENCY DEPT VISIT HI MDM: CPT

## 2024-07-16 PROCEDURE — 36415 COLL VENOUS BLD VENIPUNCTURE: CPT | Performed by: EMERGENCY MEDICINE

## 2024-07-16 PROCEDURE — 70498 CT ANGIOGRAPHY NECK: CPT

## 2024-07-16 PROCEDURE — 96372 THER/PROPH/DIAG INJ SC/IM: CPT

## 2024-07-16 PROCEDURE — 82948 REAGENT STRIP/BLOOD GLUCOSE: CPT

## 2024-07-16 PROCEDURE — 99285 EMERGENCY DEPT VISIT HI MDM: CPT | Performed by: EMERGENCY MEDICINE

## 2024-07-16 PROCEDURE — 85025 COMPLETE CBC W/AUTO DIFF WBC: CPT | Performed by: EMERGENCY MEDICINE

## 2024-07-16 PROCEDURE — 96374 THER/PROPH/DIAG INJ IV PUSH: CPT

## 2024-07-16 RX ORDER — ONDANSETRON 2 MG/ML
4 INJECTION INTRAMUSCULAR; INTRAVENOUS ONCE
Status: COMPLETED | OUTPATIENT
Start: 2024-07-16 | End: 2024-07-16

## 2024-07-16 RX ORDER — DIPHENHYDRAMINE HYDROCHLORIDE 50 MG/ML
25 INJECTION INTRAMUSCULAR; INTRAVENOUS ONCE
Status: COMPLETED | OUTPATIENT
Start: 2024-07-16 | End: 2024-07-16

## 2024-07-16 RX ORDER — SUMATRIPTAN 6 MG/.5ML
6 INJECTION, SOLUTION SUBCUTANEOUS ONCE
Status: COMPLETED | OUTPATIENT
Start: 2024-07-16 | End: 2024-07-16

## 2024-07-16 RX ORDER — DEXAMETHASONE SODIUM PHOSPHATE 10 MG/ML
10 INJECTION, SOLUTION INTRAMUSCULAR; INTRAVENOUS ONCE
Status: COMPLETED | OUTPATIENT
Start: 2024-07-16 | End: 2024-07-16

## 2024-07-16 RX ORDER — DEXTROSE MONOHYDRATE 25 G/50ML
25 INJECTION, SOLUTION INTRAVENOUS ONCE
Status: COMPLETED | OUTPATIENT
Start: 2024-07-16 | End: 2024-07-16

## 2024-07-16 RX ADMIN — DIPHENHYDRAMINE HYDROCHLORIDE 25 MG: 50 INJECTION, SOLUTION INTRAMUSCULAR; INTRAVENOUS at 21:14

## 2024-07-16 RX ADMIN — DEXTROSE MONOHYDRATE 25 ML: 25 INJECTION, SOLUTION INTRAVENOUS at 22:09

## 2024-07-16 RX ADMIN — SODIUM CHLORIDE 1000 ML: 0.9 INJECTION, SOLUTION INTRAVENOUS at 21:12

## 2024-07-16 RX ADMIN — DEXAMETHASONE SODIUM PHOSPHATE 10 MG: 10 INJECTION, SOLUTION INTRAMUSCULAR; INTRAVENOUS at 21:40

## 2024-07-16 RX ADMIN — SUMATRIPTAN 6 MG: 6 INJECTION, SOLUTION SUBCUTANEOUS at 21:20

## 2024-07-16 RX ADMIN — IOHEXOL 100 ML: 350 INJECTION, SOLUTION INTRAVENOUS at 22:25

## 2024-07-16 RX ADMIN — ONDANSETRON 4 MG: 2 INJECTION INTRAMUSCULAR; INTRAVENOUS at 21:13

## 2024-07-17 VITALS
HEART RATE: 100 BPM | BODY MASS INDEX: 21.99 KG/M2 | TEMPERATURE: 98.1 F | WEIGHT: 124.12 LBS | OXYGEN SATURATION: 97 % | RESPIRATION RATE: 16 BRPM | DIASTOLIC BLOOD PRESSURE: 57 MMHG | SYSTOLIC BLOOD PRESSURE: 102 MMHG

## 2024-07-17 PROBLEM — F41.9 ANXIETY: Status: ACTIVE | Noted: 2024-07-17

## 2024-07-17 PROBLEM — E16.2 HYPOGLYCEMIA: Status: ACTIVE | Noted: 2024-07-17

## 2024-07-17 PROBLEM — D50.9 IRON DEFICIENCY ANEMIA: Status: ACTIVE | Noted: 2018-12-12

## 2024-07-17 PROBLEM — D50.8 IRON DEFICIENCY ANEMIA SECONDARY TO INADEQUATE DIETARY IRON INTAKE: Status: ACTIVE | Noted: 2018-12-12

## 2024-07-17 PROBLEM — G44.89 OTHER HEADACHE SYNDROME: Status: ACTIVE | Noted: 2024-07-17

## 2024-07-17 LAB
ANION GAP SERPL CALCULATED.3IONS-SCNC: 8 MMOL/L (ref 4–13)
BUN SERPL-MCNC: 8 MG/DL (ref 5–25)
CALCIUM SERPL-MCNC: 9 MG/DL (ref 8.4–10.2)
CHLORIDE SERPL-SCNC: 109 MMOL/L (ref 96–108)
CO2 SERPL-SCNC: 21 MMOL/L (ref 21–32)
CREAT SERPL-MCNC: 0.63 MG/DL (ref 0.6–1.3)
ERYTHROCYTE [DISTWIDTH] IN BLOOD BY AUTOMATED COUNT: 19 % (ref 11.6–15.1)
FERRITIN SERPL-MCNC: 2 NG/ML (ref 11–307)
GFR SERPL CREATININE-BSD FRML MDRD: 121 ML/MIN/1.73SQ M
GLUCOSE SERPL-MCNC: 120 MG/DL (ref 65–140)
GLUCOSE SERPL-MCNC: 121 MG/DL (ref 65–140)
GLUCOSE SERPL-MCNC: 125 MG/DL (ref 65–140)
GLUCOSE SERPL-MCNC: 139 MG/DL (ref 65–140)
GLUCOSE SERPL-MCNC: 140 MG/DL (ref 65–140)
GLUCOSE SERPL-MCNC: 188 MG/DL (ref 65–140)
GLUCOSE SERPL-MCNC: 216 MG/DL (ref 65–140)
GLUCOSE SERPL-MCNC: 220 MG/DL (ref 65–140)
GLUCOSE SERPL-MCNC: 302 MG/DL (ref 65–140)
HCT VFR BLD AUTO: 26.6 % (ref 34.8–46.1)
HGB BLD-MCNC: 7.5 G/DL (ref 11.5–15.4)
IRON SERPL-MCNC: <10 UG/DL (ref 50–212)
MAGNESIUM SERPL-MCNC: 1.7 MG/DL (ref 1.9–2.7)
MCH RBC QN AUTO: 17.9 PG (ref 26.8–34.3)
MCHC RBC AUTO-ENTMCNC: 28.2 G/DL (ref 31.4–37.4)
MCV RBC AUTO: 63 FL (ref 82–98)
PLATELET # BLD AUTO: 480 THOUSANDS/UL (ref 149–390)
PMV BLD AUTO: 8.7 FL (ref 8.9–12.7)
POTASSIUM SERPL-SCNC: 3.9 MMOL/L (ref 3.5–5.3)
RBC # BLD AUTO: 4.2 MILLION/UL (ref 3.81–5.12)
SODIUM SERPL-SCNC: 138 MMOL/L (ref 135–147)
UIBC SERPL-MCNC: 705 UG/DL (ref 155–355)
WBC # BLD AUTO: 10.09 THOUSAND/UL (ref 4.31–10.16)

## 2024-07-17 PROCEDURE — 99236 HOSP IP/OBS SAME DATE HI 85: CPT | Performed by: FAMILY MEDICINE

## 2024-07-17 PROCEDURE — 83540 ASSAY OF IRON: CPT | Performed by: FAMILY MEDICINE

## 2024-07-17 PROCEDURE — 83550 IRON BINDING TEST: CPT | Performed by: FAMILY MEDICINE

## 2024-07-17 PROCEDURE — 82728 ASSAY OF FERRITIN: CPT | Performed by: FAMILY MEDICINE

## 2024-07-17 PROCEDURE — 80048 BASIC METABOLIC PNL TOTAL CA: CPT

## 2024-07-17 PROCEDURE — 85027 COMPLETE CBC AUTOMATED: CPT

## 2024-07-17 PROCEDURE — NC001 PR NO CHARGE

## 2024-07-17 PROCEDURE — 82948 REAGENT STRIP/BLOOD GLUCOSE: CPT

## 2024-07-17 PROCEDURE — 83735 ASSAY OF MAGNESIUM: CPT

## 2024-07-17 RX ORDER — BLOOD SUGAR DIAGNOSTIC
STRIP MISCELLANEOUS
Qty: 100 EACH | Refills: 0 | Status: SHIPPED | OUTPATIENT
Start: 2024-07-17

## 2024-07-17 RX ORDER — GLUCOSAMINE HCL/CHONDROITIN SU 500-400 MG
CAPSULE ORAL
Qty: 100 EACH | Refills: 0 | Status: SHIPPED | OUTPATIENT
Start: 2024-07-17

## 2024-07-17 RX ORDER — AMITRIPTYLINE HYDROCHLORIDE 25 MG/1
25 TABLET, FILM COATED ORAL
Qty: 30 TABLET | Refills: 0 | Status: SHIPPED | OUTPATIENT
Start: 2024-07-17

## 2024-07-17 RX ORDER — LORAZEPAM 2 MG/ML
1 INJECTION INTRAMUSCULAR ONCE
Status: COMPLETED | OUTPATIENT
Start: 2024-07-17 | End: 2024-07-17

## 2024-07-17 RX ORDER — HYDROXYZINE HYDROCHLORIDE 25 MG/1
25 TABLET, FILM COATED ORAL EVERY 6 HOURS PRN
COMMUNITY

## 2024-07-17 RX ORDER — SUMATRIPTAN 50 MG/1
50 TABLET, FILM COATED ORAL ONCE AS NEEDED
Status: COMPLETED | OUTPATIENT
Start: 2024-07-17 | End: 2024-07-17

## 2024-07-17 RX ORDER — LANOLIN ALCOHOL/MO/W.PET/CERES
400 CREAM (GRAM) TOPICAL ONCE
Status: COMPLETED | OUTPATIENT
Start: 2024-07-17 | End: 2024-07-17

## 2024-07-17 RX ORDER — AMITRIPTYLINE HYDROCHLORIDE 25 MG/1
25 TABLET, FILM COATED ORAL
Status: DISCONTINUED | OUTPATIENT
Start: 2024-07-17 | End: 2024-07-17 | Stop reason: HOSPADM

## 2024-07-17 RX ORDER — LANCETS 33 GAUGE
EACH MISCELLANEOUS
Qty: 100 EACH | Refills: 0 | Status: SHIPPED | OUTPATIENT
Start: 2024-07-17

## 2024-07-17 RX ORDER — BLOOD-GLUCOSE METER
KIT MISCELLANEOUS
Qty: 1 KIT | Refills: 0 | Status: SHIPPED | OUTPATIENT
Start: 2024-07-17

## 2024-07-17 RX ORDER — HYDROXYZINE HYDROCHLORIDE 25 MG/1
25 TABLET, FILM COATED ORAL EVERY 6 HOURS PRN
Status: DISCONTINUED | OUTPATIENT
Start: 2024-07-17 | End: 2024-07-17 | Stop reason: HOSPADM

## 2024-07-17 RX ORDER — MAGNESIUM SULFATE HEPTAHYDRATE 40 MG/ML
2 INJECTION, SOLUTION INTRAVENOUS ONCE
Status: DISCONTINUED | OUTPATIENT
Start: 2024-07-17 | End: 2024-07-17

## 2024-07-17 RX ORDER — HYDROMORPHONE HCL/PF 1 MG/ML
1 SYRINGE (ML) INJECTION ONCE
Status: COMPLETED | OUTPATIENT
Start: 2024-07-17 | End: 2024-07-17

## 2024-07-17 RX ADMIN — MAGNESIUM SULFATE HEPTAHYDRATE 2 G: 40 INJECTION, SOLUTION INTRAVENOUS at 12:56

## 2024-07-17 RX ADMIN — SUMATRIPTAN SUCCINATE 50 MG: 50 TABLET ORAL at 02:33

## 2024-07-17 RX ADMIN — LORAZEPAM 1 MG: 2 INJECTION INTRAMUSCULAR; INTRAVENOUS at 03:56

## 2024-07-17 RX ADMIN — Medication 400 MG: at 14:32

## 2024-07-17 RX ADMIN — HYDROXYZINE HYDROCHLORIDE 25 MG: 25 TABLET ORAL at 02:33

## 2024-07-17 RX ADMIN — HYDROMORPHONE HYDROCHLORIDE 1 MG: 1 INJECTION, SOLUTION INTRAMUSCULAR; INTRAVENOUS; SUBCUTANEOUS at 02:41

## 2024-07-17 RX ADMIN — IRON SUCROSE 200 MG: 20 INJECTION, SOLUTION INTRAVENOUS at 14:25

## 2024-07-17 NOTE — ED PROVIDER NOTES
History  Chief Complaint   Patient presents with    Headache     C/o intermittent headache that began today     This is a 28-year-old female presenting to the ED for evaluation of severe onset of thunderclap headache while at home.  States that she has a history of headaches however this is the worst headache of her life.  Patient denies any chest pain.  Complains of generalized weakness, dizziness malaise and fatigue which is chronic for her but worse today.        Prior to Admission Medications   Prescriptions Last Dose Informant Patient Reported? Taking?   Ibuprofen-diphenhydrAMINE Cit (ADVIL PM PO) Not Taking  Yes No   Sig: Take 1 tablet by mouth daily at bedtime   Patient not taking: Reported on 11/3/2022   acetaminophen (TYLENOL) 500 mg tablet Not Taking  Yes No   Sig: Take 1,000 mg by mouth every 6 (six) hours as needed for mild pain   Patient not taking: Reported on 2023   ferrous sulfate 325 (65 Fe) mg tablet Not Taking  Yes No   Sig: Take 325 mg by mouth daily with breakfast   Patient not taking: Reported on 2023   hydrOXYzine HCL (ATARAX) 25 mg tablet   Yes Yes   Sig: Take 25 mg by mouth every 6 (six) hours as needed for itching      Facility-Administered Medications: None       Past Medical History:   Diagnosis Date    Anemia     chronic-    Anxiety     Fatty liver     History of transfusion     Low back pain     Pancreatitis     Pilonidal cyst        Past Surgical History:   Procedure Laterality Date    ABDOMINAL SURGERY       SECTION      CHOLANGIOGRAM N/A 2018    Procedure: CHOLANGIOGRAM;  Surgeon: Bran Williamson MD;  Location:  MAIN OR;  Service: General    CHOLECYSTECTOMY LAPAROSCOPIC N/A 2018    Procedure: CHOLECYSTECTOMY LAPAROSCOPIC;  Surgeon: Bran Williamson MD;  Location:  MAIN OR;  Service: General    DILATION AND CURETTAGE OF UTERUS      FL CHOLANGIOGRAM T TUBE  2018    HI EXCISION PILONIDAL CYST/SINUS COMPLICATED N/A 2022    Procedure: EXCISION  PILONIDAL CYST;  Surgeon: Leighann Gomez DO;  Location:  MAIN OR;  Service: General    ELA-EN-Y PROCEDURE  06/2018       History reviewed. No pertinent family history.  I have reviewed and agree with the history as documented.    E-Cigarette/Vaping    E-Cigarette Use Never User      E-Cigarette/Vaping Substances     Social History     Tobacco Use    Smoking status: Every Day     Current packs/day: 0.25     Types: Cigarettes    Smokeless tobacco: Never    Tobacco comments:     Pt reports smoking 4-5 cigarettes a day   Vaping Use    Vaping status: Never Used   Substance Use Topics    Alcohol use: No    Drug use: Never       Review of Systems   Constitutional:  Negative for chills and fever.   HENT:  Negative for ear pain and sore throat.    Eyes:  Negative for pain and visual disturbance.   Respiratory:  Negative for cough and shortness of breath.    Cardiovascular:  Negative for chest pain and palpitations.   Gastrointestinal:  Negative for abdominal pain and vomiting.   Genitourinary:  Negative for dysuria and hematuria.   Musculoskeletal:  Negative for arthralgias and back pain.   Skin:  Negative for color change and rash.   Neurological:  Positive for dizziness, weakness, light-headedness and headaches. Negative for seizures and syncope.   All other systems reviewed and are negative.      Physical Exam  Physical Exam  Vitals and nursing note reviewed.   Constitutional:       General: She is not in acute distress.     Appearance: Normal appearance. She is well-developed. She is ill-appearing. She is not toxic-appearing or diaphoretic.   HENT:      Head: Normocephalic and atraumatic.      Right Ear: External ear normal.      Left Ear: External ear normal.      Nose: Nose normal.      Mouth/Throat:      Mouth: Mucous membranes are dry.   Eyes:      Extraocular Movements: Extraocular movements intact.      Comments: Pale conjunctiva bilaterally   Cardiovascular:      Rate and Rhythm: Normal rate and  regular rhythm.      Heart sounds: No murmur heard.  Pulmonary:      Effort: Pulmonary effort is normal. No respiratory distress.      Breath sounds: Normal breath sounds.   Abdominal:      General: Abdomen is flat.      Palpations: Abdomen is soft.      Tenderness: There is no abdominal tenderness.   Musculoskeletal:         General: No swelling or signs of injury.      Cervical back: Neck supple.   Skin:     General: Skin is warm and dry.      Capillary Refill: Capillary refill takes less than 2 seconds.      Coloration: Skin is pale.   Neurological:      General: No focal deficit present.      Mental Status: She is alert and oriented to person, place, and time. Mental status is at baseline.   Psychiatric:         Mood and Affect: Mood normal.         Vital Signs  ED Triage Vitals [07/16/24 2001]   Temperature Pulse Respirations Blood Pressure SpO2   98.1 °F (36.7 °C) 105 18 117/63 100 %      Temp Source Heart Rate Source Patient Position - Orthostatic VS BP Location FiO2 (%)   Temporal Monitor Sitting Right arm --      Pain Score       10 - Worst Possible Pain           Vitals:    07/17/24 0130 07/17/24 0146 07/17/24 0715 07/17/24 1403   BP: 126/61 105/63 101/61 102/57   Pulse: 99 101 83 100   Patient Position - Orthostatic VS:    Sitting         Visual Acuity      ED Medications  Medications   ondansetron (ZOFRAN) injection 4 mg (4 mg Intravenous Given 7/16/24 2113)   sodium chloride 0.9 % bolus 1,000 mL (0 mL Intravenous Stopped 7/16/24 2212)   diphenhydrAMINE (BENADRYL) injection 25 mg (25 mg Intravenous Given 7/16/24 2114)   SUMAtriptan (IMITREX) subcutaneous injection 6 mg (6 mg Subcutaneous Given 7/16/24 2120)   dexamethasone (PF) (DECADRON) injection 10 mg (10 mg Intravenous Given 7/16/24 2140)   dextrose 50 % IV solution 25 mL (25 mL Intravenous Given 7/16/24 2209)   iohexol (OMNIPAQUE) 350 MG/ML injection (MULTI-DOSE) 100 mL (100 mL Intravenous Given 7/16/24 2225)   SUMAtriptan (IMITREX) tablet 50 mg (50  mg Oral Given 7/17/24 0233)   HYDROmorphone (DILAUDID) injection 1 mg (1 mg Intravenous Given 7/17/24 0241)   LORazepam (ATIVAN) injection 1 mg (1 mg Intravenous Given 7/17/24 0356)   iron sucrose (VENOFER) 200 mg in sodium chloride 0.9 % 100 mL IVPB (0 mg Intravenous Stopped 7/17/24 1530)   magnesium Oxide (MAG-OX) tablet 400 mg (400 mg Oral Given 7/17/24 1432)       Diagnostic Studies  Results Reviewed       Procedure Component Value Units Date/Time    Fingerstick Glucose (POCT) [[id number]]  (Normal) Collected: 07/17/24 0050    Lab Status: Final result Specimen: Blood Updated: 07/17/24 0051     POC Glucose 140 mg/dl     Fingerstick Glucose (POCT) [[id number]]  (Normal) Collected: 07/16/24 2355    Lab Status: Final result Specimen: Blood Updated: 07/16/24 2355     POC Glucose 73 mg/dl     Fingerstick Glucose (POCT) [[id number]]  (Normal) Collected: 07/16/24 2324    Lab Status: Final result Specimen: Blood Updated: 07/16/24 2325     POC Glucose 75 mg/dl     Fingerstick Glucose (POCT) [[id number]]  (Abnormal) Collected: 07/16/24 2226    Lab Status: Final result Specimen: Blood Updated: 07/16/24 2227     POC Glucose 169 mg/dl     Fingerstick Glucose (POCT) [[id number]]  (Abnormal) Collected: 07/16/24 2206    Lab Status: Final result Specimen: Blood Updated: 07/16/24 2207     POC Glucose 41 mg/dl     Basic metabolic panel [[id number]]  (Abnormal) Collected: 07/16/24 2112    Lab Status: Final result Specimen: Blood from Arm, Left Updated: 07/16/24 2204     Sodium 137 mmol/L      Potassium 4.2 mmol/L      Chloride 108 mmol/L      CO2 25 mmol/L      ANION GAP 4 mmol/L      BUN 9 mg/dL      Creatinine 0.61 mg/dL      Glucose 38 mg/dL      Calcium 8.7 mg/dL      eGFR 122 ml/min/1.73sq m     Narrative:      National Kid[employer] guidelines for Chronic Kidney Disease (CKD):     Stage 1 with normal or high GFR (GFR > 90 mL/min/1.73 square meters)    Stage 2 Mild CKD (GFR = 60-89 mL/min/1.73 square meters)    Stage 3A  Moderate CKD (GFR = 45-59 mL/min/1.73 square meters)    Stage 3B Moderate CKD (GFR = 30-44 mL/min/1.73 square meters)    Stage 4 Severe CKD (GFR = 15-29 mL/min/1.73 square meters)    Stage 5 End Stage CKD (GFR <15 mL/min/1.73 square meters)  Note: GFR calculation is accurate only with a steady state creatinine    Sedimentation rate, automated [[id number]]  (Abnormal) Collected: 07/16/24 2112    Lab Status: Final result Specimen: Blood from Arm, Left Updated: 07/16/24 2134     Sed Rate 51 mm/hour     CBC and differential [[id number]]  (Abnormal) Collected: 07/16/24 2112    Lab Status: Final result Specimen: Blood from Arm, Left Updated: 07/16/24 2130     WBC 7.41 Thousand/uL      RBC 4.00 Million/uL      Hemoglobin 7.0 g/dL      Hematocrit 25.3 %      MCV 63 fL      MCH 17.5 pg      MCHC 27.7 g/dL      RDW 18.9 %      MPV 9.0 fL      Platelets 461 Thousands/uL      nRBC 0 /100 WBCs      Segmented % 56 %      Immature Grans % 0 %      Lymphocytes % 30 %      Monocytes % 12 %      Eosinophils Relative 1 %      Basophils Relative 1 %      Absolute Neutrophils 4.23 Thousands/µL      Absolute Immature Grans 0.02 Thousand/uL      Absolute Lymphocytes 2.21 Thousands/µL      Absolute Monocytes 0.86 Thousand/µL      Eosinophils Absolute 0.05 Thousand/µL      Basophils Absolute 0.04 Thousands/µL     POCT pregnancy, urine [[id number]]  (Normal) Resulted: 07/16/24 2111    Lab Status: Final result Specimen: Urine Updated: 07/16/24 2111     EXT Preg Test, Ur Negative     Control Valid                   CTA head and neck with and without contrast   Final Result by Miles Hall DO (07/16 2300)      CT Brain:  No acute intracranial abnormality.      CT Angiography:  Unremarkable CTA neck and brain.                  Workstation performed: [id number]                    Procedures  Procedures         ED Course  ED Course as of 07/17/24 2010 Tue Jul 16, 2024   1289 Patient found to have low blood sugar and given food as well as dextrose  in the ED.  Discussed with patient I am concerned that her glucose is low and she has no diabetes diagnosis and she is also anemic.  Patient denies any heavy bleeding or black stools.  Discussed strong recommendations for admission.   Wed Jul 17, 2024   0000 Case was discussed with the hospitalist who will see the patient at bedside                                 SBIRT 22yo+      Flowsheet Row Most Recent Value   Initial Alcohol Screen: US AUDIT-C     1. How often do you have a drink containing alcohol? 0 Filed at: 07/16/2024 2003   2. How many drinks containing alcohol do you have on a typical day you are drinking?  0 Filed at: 07/16/2024 2003   3b. FEMALE Any Age, or MALE 65+: How often do you have 4 or more drinks on one occassion? 0 Filed at: 07/16/2024 2003   Audit-C Score 0 Filed at: 07/16/2024 2003   SHERWIN: How many times in the past year have you...    Used an illegal drug or used a prescription medication for non-medical reasons? Never Filed at: 07/16/2024 2003                      Medical Decision Making  28-year-old female presenting to the ED for generalized fatigue and weakness.  Incidental found to have hypoglycemia.  Differential diagnosis includes but not limited to: Dehydration, type 1 diabetes, electrolyte abnormality, intracranial bleed, adrenal insufficiency    Amount and/or Complexity of Data Reviewed  Labs: ordered.  Radiology: ordered.    Risk  Prescription drug management.  Decision regarding hospitalization.                 Disposition  Final diagnoses:   Anemia   Hypoglycemia     Time reflects when diagnosis was documented in both MDM as applicable and the Disposition within this note       Time User Action Codes Description Comment    7/17/2024 12:00 AM Bela Messina Add [D64.9] Anemia     7/17/2024 12:00 AM Bela Messina Add [E11.649] Severe diabetic hypoglycemia (HCC)     7/17/2024 12:00 AM Bela Messina Remove [E11.649] Severe diabetic hypoglycemia (HCC)     7/17/2024 12:01 AM  Mayuri Michaelia Add [E16.2] Hypoglycemia     7/17/2024  2:30 PM Shelia Gage Add [G44.89] Other headache syndrome           ED Disposition       ED Disposition   Admit    Condition   Stable    Date/Time   Wed Jul 17, 2024 0000    Comment                  Follow-up Information       Follow up With Specialties Details Why Contact Info    Mary Cui MD Internal Medicine Schedule an appointment as soon as possible for a visit in 1 week(s)  [address] [facility] [address]  [phone]      Mary Cui MD Internal Medicine   [address] [facility] [address]  [phone]              Discharge Medication List as of 7/17/2024  2:51 PM        START taking these medications    Details   Alcohol Swabs 70 % PADS May substitute brand based on insurance coverage. Check glucose BID., Normal      amitriptyline (ELAVIL) 25 mg tablet Take 1 tablet (25 mg total) by mouth daily at bedtime, Starting Wed 7/17/2024, Normal      Blood Glucose Monitoring Suppl (OneTouch Verio Reflect) w/Device KIT May substitute brand based on insurance coverage. Check glucose BID., Normal      glucose blood (OneTouch Verio) test strip May substitute brand based on insurance coverage. Check glucose BID., Normal      OneTouch Delica Lancets 33G MISC May substitute brand based on insurance coverage. Check glucose BID., Normal           CONTINUE these medications which have NOT CHANGED    Details   hydrOXYzine HCL (ATARAX) 25 mg tablet Take 25 mg by mouth every 6 (six) hours as needed for itching, Historical Med           STOP taking these medications       acetaminophen (TYLENOL) 500 mg tablet Comments:   Reason for Stopping:         ferrous sulfate 325 (65 Fe) mg tablet Comments:   Reason for Stopping:         Ibuprofen-diphenhydrAMINE Cit (ADVIL PM PO) Comments:   Reason for Stopping:               Outpatient Discharge Orders   CBC and Platelet   Standing Status: Future Standing Exp. Date: 07/17/25     Ambulatory Referral to  Hematology / Oncology   Standing Status: Future Standing Exp. Date: 07/17/25      Discharge Diet     Activity as tolerated     Call provider for:  persistent nausea or vomiting     Call provider for:  hives     Call provider for:  difficulty breathing, headache or visual disturbances     Call provider for:  extreme fatigue     Call provider for:  persistent dizziness or light-headedness       PDMP Review       None            ED Provider  Electronically Signed by             Bela Messina DO  07/17/24 2010

## 2024-07-17 NOTE — ED NOTES
Spoke with Dr. Messina and made aware of pt POC blood glucose recheck of 75. Per Dr. Messina recheck blood sugar in 20 minutes. No additional orders at this time.      Whit Ortiz RN  07/16/24 8158

## 2024-07-17 NOTE — DISCHARGE INSTR - AVS FIRST PAGE
Dear Analilia Mathew,     It was our pleasure to care for you here at [facility]. For follow up as well as any medication refills, we recommend that you follow up with your primary care physician. Here are the most important instructions/ recommendations at discharge:     Notable Medication Adjustments -   Start amitriptyline 25mg nightly   Testing Required after Discharge -   None   Important follow up information -   Follow up with hematology for iron infusions   Follow up with family doctor in one week  Other Instructions -   Monitor sugars twice daily  If symptoms worsen or new symptoms arise, come back to the hospital   Please review this entire after visit summary as additional general instructions including medication list, appointments, activity, diet, any pertinent wound care, and other additional recommendations from your care team that may be provided for you.      Sincerely,     Shelia Gage PA-C

## 2024-07-17 NOTE — ASSESSMENT & PLAN NOTE
Patient has a history with intermittent headaches.    She takes Neurontin which she says is not very helpful but she takes about 100 mg daily.    She had a MRI brain 6/19/24 that was unremarkable and an CTA on admission that is unremarkable.    I will try her on a tricyclic antidepressant to take 25 mg daily nightly.    I would also use Imitrex as needed.   Headache has resolved at time of discharge

## 2024-07-17 NOTE — ASSESSMENT & PLAN NOTE
Patient has a hemoglobin of 7 on admission  May be contributing to her headaches.  Reports that she does not eat a lot of red meat   Does not report heavy menses  Iron panel with low iron  Transfuse if hgb <7  Hematology referral made on discharge for iron infusions, as patient cannot digest oral iron secondary to gastric bypass surgery

## 2024-07-17 NOTE — DISCHARGE SUMMARY
[facility]  Discharge- Analilia Mathew 1995, 28 y.o. female MRN: [id number]  Unit/Bed#: -Edie Encounter: [id number]  Primary Care Provider: Mary Cui MD   Date and time admitted to hospital: 7/16/2024  7:58 PM    * Other headache syndrome  Assessment & Plan  Patient has a history with intermittent headaches.    She takes Neurontin which she says is not very helpful but she takes about 100 mg daily.    She had a MRI brain 6/19/24 that was unremarkable and an CTA on admission that is unremarkable.    I will try her on a tricyclic antidepressant to take 25 mg daily nightly.    I would also use Imitrex as needed.   Headache has resolved at time of discharge     Anxiety  Assessment & Plan  Continue prn atarax    Hypoglycemia  Assessment & Plan  Patient's blood sugar is very low here in the emergency room.    It improved about 300 after she ate.   Monitor glucose -  has remained stable since   Glucometer prescribed at time of discharge  Follow up with pcp for further management     Iron deficiency anemia  Assessment & Plan  Patient has a hemoglobin of 7 on admission  May be contributing to her headaches.  Reports that she does not eat a lot of red meat   Does not report heavy menses  Iron panel with low iron  Transfuse if hgb <7  Hematology referral made on discharge for iron infusions, as patient cannot digest oral iron secondary to gastric bypass surgery      Medical Problems       Resolved Problems  Date Reviewed: 7/17/2024   None       Discharging Physician / Practitioner: Shelia Gage PA-C  PCP: Mary uCi MD  Admission Date:   Admission Orders (From admission, onward)       Ordered        07/17/24 0001  Place in Observation  Once                          Discharge Date: 07/17/24    Consultations During Hospital Stay:  none    Procedures Performed:   None     Significant Findings / Test Results:   Cta head and neck unremarkable     Incidental Findings:   none      Test Results Pending at Discharge (will require follow up):   None      Outpatient Tests Requested:  None     Complications:  none     Reason for Admission: headache    Hospital Course:   Analilia Mathew is a 28 y.o. female patient who originally presented to the hospital on 7/16/2024 due to headache. Patient found to have hypoglycemia. Blood glucose since then has been normal or elevated. Glucometer prescribed on discharge and patient is to follow-up with PCP for further management.  Patient also found to have chronic anemia.  Patient given dose of IV iron and is to follow-up with hematology on discharge for further iron infusions.  Headache resolved at time of discharge and patient being discharged with nightly amitriptyline.    Please see above list of diagnoses and related plan for additional information.     Condition at Discharge: good    Discharge Day Visit / Exam:   Subjective:  patient states that she is feeling well at this time. Denies chest pain, shortness of breath or abdominal pain. Does not offer any further complaints at this time and is agreeable to outpatient follow ups.   Vitals: Blood Pressure: 102/57 (07/17/24 1403)  Pulse: 100 (07/17/24 1403)  Temperature: 98.1 °F (36.7 °C) (07/17/24 1403)  Temp Source: Temporal (07/17/24 1403)  Respirations: 16 (07/17/24 1403)  Weight - Scale: 56.3 kg (124 lb 1.9 oz) (07/16/24 2001)  SpO2: 97 % (07/17/24 1403)  Exam:   Physical Exam  Vitals reviewed.   Constitutional:       General: She is not in acute distress.     Appearance: Normal appearance. She is not ill-appearing.   HENT:      Head: Normocephalic and atraumatic.      Nose: Nose normal.      Mouth/Throat:      Mouth: Mucous membranes are moist.      Pharynx: Oropharynx is clear.   Eyes:      Extraocular Movements: Extraocular movements intact.      Conjunctiva/sclera: Conjunctivae normal.   Cardiovascular:      Rate and Rhythm: Normal rate and regular rhythm.      Pulses: Normal pulses.      Heart  sounds: Normal heart sounds. No murmur heard.  Pulmonary:      Effort: Pulmonary effort is normal. No respiratory distress.      Breath sounds: Normal breath sounds. No wheezing or rhonchi.   Abdominal:      General: Abdomen is flat. Bowel sounds are normal. There is no distension.      Palpations: Abdomen is soft.      Tenderness: There is no abdominal tenderness. There is no guarding.   Musculoskeletal:         General: Normal range of motion.      Cervical back: Normal range of motion.      Right lower leg: No edema.      Left lower leg: No edema.   Skin:     General: Skin is warm.   Neurological:      General: No focal deficit present.      Mental Status: She is alert and oriented to person, place, and time. Mental status is at baseline.      Motor: No weakness.   Psychiatric:         Mood and Affect: Mood normal.         Behavior: Behavior normal.         Thought Content: Thought content normal.         Judgment: Judgment normal.          Discussion with Family: Updated  () at bedside.    Discharge instructions/Information to patient and family:   See after visit summary for information provided to patient and family.      Provisions for Follow-Up Care:  See after visit summary for information related to follow-up care and any pertinent home health orders.      Mobility at time of Discharge:   Basic Mobility Inpatient Raw Score: 24  JH-HLM Goal: 8: Walk 250 feet or more  JH-HLM Achieved: 8: Walk 250 feet ot more  HLM Goal achieved. Continue to encourage appropriate mobility.     Disposition:   Home    Planned Readmission: none     Discharge Statement:  I spent 45 minutes discharging the patient. This time was spent on the day of discharge. I had direct contact with the patient on the day of discharge. Greater than 50% of the total time was spent examining patient, answering all patient questions, arranging and discussing plan of care with patient as well as directly providing post-discharge  instructions.  Additional time then spent on discharge activities.    Discharge Medications:  See after visit summary for reconciled discharge medications provided to patient and/or family.      **Please Note: This note may have been constructed using a voice recognition system**

## 2024-07-17 NOTE — ED NOTES
Dr. KEITH Nguyen informed this nurse he will see pt prior to ordering something for anxiety and he plans to see pt soon.     Whit Ortiz, ABY  07/17/24 0124

## 2024-07-17 NOTE — ASSESSMENT & PLAN NOTE
Patient's blood sugar is very low here in the emergency room.  It improved about 300 after she ate.  On it check her sugars every couple hours overnight and see where she stands when she is not eating.  I do not know why her sugars are so low I am unsure if it is because she did not eat much prior to coming in system hypoglycemia can cause headaches.  Not impossible she may have an insulinoma.  I will check her sugars every couple hours over the next 10 hours

## 2024-07-17 NOTE — ED NOTES
Pts fingerstick POC completed and Dr. Messina made aware of pts POC blood sugar of 73. Pt given ginger ale and eating chips. No further orders at this time.     Whit Ortiz, ABY  07/16/24 7133

## 2024-07-17 NOTE — ED NOTES
Pt is asking if she can have something for anxiety as she normally takes something at home for anxiety. Message sent to Dr. KEITH Nguyen to make aware of the above.      Whit Ortiz RN  07/17/24 2632

## 2024-07-17 NOTE — PLAN OF CARE
Problem: METABOLIC, FLUID AND ELECTROLYTES - ADULT  Goal: Electrolytes maintained within normal limits  Description: INTERVENTIONS:  - Monitor labs and assess patient for signs and symptoms of electrolyte imbalances monitor blood sugars  - Administer electrolyte replacement as ordered  - Monitor response to electrolyte replacements, including repeat lab results as appropriate  - Instruct patient on fluid and nutrition as appropriate  Outcome: Progressing  Goal: Fluid balance maintained  Description: INTERVENTIONS:  - Monitor labs   - Monitor I/O and WT  - Instruct patient on fluid and nutrition as appropriate  - Assess for signs & symptoms of volume excess or deficit  Outcome: Progressing  Goal: Glucose maintained within target range  Description: INTERVENTIONS:  - Monitor Blood Glucose as ordered  - Assess for signs and symptoms of hyperglycemia and hypoglycemia  - Administer ordered medications to maintain glucose within target range  - Assess nutritional intake and initiate nutrition service referral as needed  Outcome: Progressing     Problem: PAIN - ADULT  Goal: Verbalizes/displays adequate comfort level or baseline comfort level  Description: Interventions:  - Encourage patient to monitor pain and request assistance  - Assess pain using appropriate pain scale  - Administer analgesics based on type and severity of pain and evaluate response  - Implement non-pharmacological measures as appropriate and evaluate response  - Consider cultural and social influences on pain and pain management  - Notify physician/advanced practitioner if interventions unsuccessful or patient reports new pain  Outcome: Progressing     Problem: INFECTION - ADULT  Goal: Absence or prevention of progression during hospitalization  Description: INTERVENTIONS:  - Assess and monitor for signs and symptoms of infection  - Monitor lab/diagnostic results  - Monitor all insertion sites, i.e. indwelling lines, tubes, and drains  - Monitor  endotracheal if appropriate and nasal secretions for changes in amount and color  - Faith appropriate cooling/warming therapies per order  - Administer medications as ordered  - Instruct and encourage patient and family to use good hand hygiene technique  - Identify and instruct in appropriate isolation precautions for identified infection/condition  Outcome: Progressing  Goal: Absence of fever/infection during neutropenic period  Description: INTERVENTIONS:  - Monitor WBC    Outcome: Progressing     Problem: SAFETY ADULT  Goal: Patient will remain free of falls  Description: INTERVENTIONS:  - Educate patient/family on patient safety including physical limitations  - Instruct patient to call for assistance with activity   - Consult OT/PT to assist with strengthening/mobility   - Keep Call bell within reach  - Keep bed low and locked with side rails adjusted as appropriate  - Keep care items and personal belongings within reach  - Initiate and maintain comfort rounds  - Make Fall Risk Sign visible to staff  - Offer Toileting every 2 Hours, in advance of need  - Initiate/Maintain   alarm  - Obtain necessary fall risk management equipment:     - Apply yellow socks and bracelet for high fall risk patients  - Consider moving patient to room near nurses station  Outcome: Progressing  Goal: Maintain or return to baseline ADL function  Description: INTERVENTIONS:  -  Assess patient's ability to carry out ADLs; assess patient's baseline for ADL function and identify physical deficits which impact ability to perform ADLs (bathing, care of mouth/teeth, toileting, grooming, dressing, etc.)  - Assess/evaluate cause of self-care deficits   - Assess range of motion  - Assess patient's mobility; develop plan if impaired  - Assess patient's need for assistive devices and provide as appropriate  - Encourage maximum independence but intervene and supervise when necessary  - Involve family in performance of ADLs  - Assess for home  care needs following discharge   - Consider OT consult to assist with ADL evaluation and planning for discharge  - Provide patient education as appropriate  Outcome: Progressing  Goal: Maintains/Returns to pre admission functional level  Description: INTERVENTIONS:  - Perform AM-PAC 6 Click Basic Mobility/ Daily Activity assessment daily.  - Set and communicate daily mobility goal to care team and patient/family/caregiver.   - Collaborate with rehabilitation services on mobility goals if consulted  - Perform Range of Motion 3 times a day.  - Reposition patient every 2 hours.  - Dangle patient 3 times a day  - Stand patient 3 times a day  - Ambulate patient 3 times a day  - Out of bed to chair 3 times a day   - Out of bed for meals 3 times a day  - Out of bed for toileting  - Record patient progress and toleration of activity level   Outcome: Progressing     Problem: DISCHARGE PLANNING  Goal: Discharge to home or other facility with appropriate resources  Description: INTERVENTIONS:  - Identify barriers to discharge w/patient and caregiver  - Arrange for needed discharge resources and transportation as appropriate  - Identify discharge learning needs (meds, wound care, etc.)  - Arrange for interpretive services to assist at discharge as needed  - Refer to Case Management Department for coordinating discharge planning if the patient needs post-hospital services based on physician/advanced practitioner order or complex needs related to functional status, cognitive ability, or social support system  Outcome: Progressing     Problem: Knowledge Deficit  Goal: Patient/family/caregiver demonstrates understanding of disease process, treatment plan, medications, and discharge instructions  Description: Complete learning assessment and assess knowledge base.  Interventions:  - Provide teaching at level of understanding  - Provide teaching via preferred learning methods  Outcome: Progressing

## 2024-07-17 NOTE — ASSESSMENT & PLAN NOTE
Patient has a hemoglobin of 7 which is low.  May be contributing to her headaches.  She does not eat a bunch of meat.  I wonder if this may be dietary.  Does not report heavy periods.  I will check iron levels

## 2024-07-17 NOTE — ED NOTES
Message sent to 3rd floor charge nurse to make aware pt will be up to the floor shortly.      Whit Ortiz RN  07/17/24 0123

## 2024-07-17 NOTE — ASSESSMENT & PLAN NOTE
Patient's blood sugar is very low here in the emergency room.    It improved about 300 after she ate.   Monitor glucose -  has remained stable since   Glucometer prescribed at time of discharge  Follow up with pcp for further management

## 2024-07-17 NOTE — UTILIZATION REVIEW
Initial Clinical Review    Admission: Date/Time/Statement:   Admission Orders (From admission, onward)       Ordered        07/17/24 0001  Place in Observation  Once                          Orders Placed This Encounter   Procedures    Place in Observation     Standing Status:   Standing     Number of Occurrences:   1     Order Specific Question:   Level of Care     Answer:   Med Surg [16]     ED Arrival Information       Expected   -    Arrival   7/16/2024 19:51    Acuity   Urgent              Means of arrival   Walk-In    Escorted by   Spouse    Service   Hospitalist    Admission type   Emergency              Arrival complaint   Head Pain             Chief Complaint   Patient presents with    Headache     C/o intermittent headache that began today       Initial Presentation: 28 y.o. female to ED via walk- in from home  Present to ED with complaint of headache. Patient reported a very intense awful headache this evening   PMHX: headaches and anxiety   Admitted to OBS with DX: Other headache syndrome   on exam: Tachy; tachypnea; BS 38; Heme 7  PLAN: monitor labs; rec'd ativan iv x1; Accuchecks; f/u iron levels      Anticipated Length of Stay/Certification Statement: Patient will be admitted on an observation basis with an anticipated length of stay of less than 2 midnights secondary to headache.         ED Triage Vitals [07/16/24 2001]   Temperature Pulse Respirations Blood Pressure SpO2 Pain Score   98.1 °F (36.7 °C) 105 18 117/63 100 % 10 - Worst Possible Pain     Weight (last 2 days)       Date/Time Weight    07/16/24 2001 56.3 (124.12)            Vital Signs (last 3 days)       Date/Time Temp Pulse Resp BP MAP (mmHg) SpO2 O2 Device Patient Position - Orthostatic VS [place] Coma Scale Score Pain    07/17/24 0715 97.9 °F (36.6 °C) 83 16 101/61 74 97 % -- -- -- --    07/17/24 0233 -- -- -- -- -- -- -- -- -- 3    07/17/24 0153 -- -- -- -- -- -- None (Room air) -- -- No Pain    07/17/24 01:46:06 97.7 °F (36.5 °C) 101  -- 105/63 77 98 % -- -- -- --    07/17/24 0130 -- 99 16 126/61 -- 99 % None (Room air) -- -- --    07/17/24 0052 -- 90 18 106/56 -- 99 % None (Room air) -- -- --    07/16/24 2345 -- 89 20 113/62 -- 99 % None (Room air) -- -- --    07/16/24 2330 -- 92 23 113/62 82 99 % None (Room air) -- -- --    07/16/24 2228 -- 90 18 127/67 90 100 % None (Room air) Sitting -- --    07/16/24 2137 -- -- -- -- -- -- None (Room air) -- 15 10 - Worst Possible Pain    07/16/24 2001 98.1 °F (36.7 °C) 105 18 117/63 -- 100 % None (Room air) Sitting -- 10 - Worst Possible Pain              Pertinent Labs/Diagnostic Test Results:   Radiology:  CTA head and neck with and without contrast   Final Interpretation by Miles Hall DO (07/16 2300)      CT Brain:  No acute intracranial abnormality.      CT Angiography:  Unremarkable CTA neck and brain.                  Workstation performed: [id number]              Results from last 7 days   Lab Units 07/16/24 2112   WBC Thousand/uL 7.41   HEMOGLOBIN g/dL 7.0*   HEMATOCRIT % 25.3*   PLATELETS Thousands/uL 461*   TOTAL NEUT ABS Thousands/µL 4.23        Results from last 7 days   Lab Units 07/16/24  2112   SODIUM mmol/L 137   POTASSIUM mmol/L 4.2   CHLORIDE mmol/L 108   CO2 mmol/L 25   ANION GAP mmol/L 4   BUN mg/dL 9   CREATININE mg/dL 0.61   EGFR ml/min/1.73sq m 122   CALCIUM mg/dL 8.7        Results from last 7 days   Lab Units 07/17/24  0757 07/17/24  0547 07/17/24  0403 07/17/24  0144 07/17/24  0050 07/16/24  2355 07/16/24  2324 07/16/24  2226 07/16/24  2206   POC GLUCOSE mg/dl 125 120 139 302* 140 73 75 169* 41*     Results from last 7 days   Lab Units 07/16/24 2112   GLUCOSE RANDOM mg/dL 38*         Results from last 7 days   Lab Units 07/16/24 2112   SED RATE mm/hour 51*          ED Treatment-Medication Administration from 07/16/2024 1951 to 07/17/2024 0140         Date/Time Order Dose Route Action     07/16/2024 2113 ondansetron (ZOFRAN) injection 4 mg 4 mg Intravenous Given     07/16/2024  2112 sodium chloride 0.9 % bolus 1,000 mL 1,000 mL Intravenous New Bag     07/16/2024 2114 diphenhydrAMINE (BENADRYL) injection 25 mg 25 mg Intravenous Given     07/16/2024 2120 SUMAtriptan (IMITREX) subcutaneous injection 6 mg 6 mg Subcutaneous Given     07/16/2024 2140 dexamethasone (PF) (DECADRON) injection 10 mg 10 mg Intravenous Given     07/16/2024 2209 dextrose 50 % IV solution 25 mL 25 mL Intravenous Given     07/16/2024 2225 iohexol (OMNIPAQUE) 350 MG/ML injection (MULTI-DOSE) 100 mL 100 mL Intravenous Given            Past Medical History:   Diagnosis Date    Anemia     chronic-    Anxiety     Fatty liver     History of transfusion     Low back pain     Pancreatitis     Pilonidal cyst      Present on Admission:   Anemia      Admitting Diagnosis: Anemia [D64.9]  Hypoglycemia [E16.2]  Headache [R51.9]    Age/Sex: 28 y.o. female    Admission Orders: SCDs; I/O; regular diet    Scheduled Medications:  amitriptyline, 25 mg, Oral, HS    LORazepam (ATIVAN) injection 1 mg  Dose: 1 mg  Freq: Once Route: IV  Start: 07/17/24 0400 End: 07/17/24 0356      Continuous IV Infusions: None       PRN Meds:  hydrOXYzine HCL, 25 mg, Oral, Q6H PRN  SUMAtriptan (IMITREX) tablet 50 mg, Oral, ONCE (recd 7/17 )         Network Utilization Review Department  ATTENTION: Please call with any questions or concerns to [phone] and carefully listen to the prompts so that you are directed to the right person. All voicemails are confidential.   For Discharge needs, contact Care Management DC Support Team at [phone] opt. 2  Send all requests for admission clinical reviews, approved or denied determinations and any other requests to dedicated fax number below belonging to the campus where the patient is receiving treatment. List of dedicated fax numbers for the Facilities:  FACILITY NAME UR FAX NUMBER   ADMISSION DENIALS (Administrative/Medical Necessity) [phone]   DISCHARGE SUPPORT TEAM (NETWORK) [phone]   PARENT CHILD  [facility] (Maternity/NICU/Pediatrics) [phone]   [facility] [phone]   [facility] [phone]   [facility] [phone]   [facility] [phone]   [facility] [phone]   [facility] [phone]   [facility] [phone]   [facility] [phone]   [facility] [phone]   [facility] [phone]   [facility] [phone]   [facility] [phone]

## 2024-07-17 NOTE — ASSESSMENT & PLAN NOTE
Patient has a history with intermittent headaches.  She takes Neurontin which she says is not very helpful but she takes about 100 mg daily.  She had a MRI back about a month ago that was unremarkable she had an MRA here there is unremarkable.  I do believe these headaches seem to be unilateral and seem to exist almost on a daily basis.  They seem somewhat consistent with migraines.  I will try her on a tricyclic antidepressant to take 25 mg daily nightly.  I would also use Imitrex as needed.  She can follow-up with her PCP.

## 2024-07-17 NOTE — H&P
[facility]  Progress Note  Name: Analilia Mathew I  MRN: [id number]  Unit/Bed#: -Edie I Date of Admission: 7/16/2024   Date of Service: 7/17/2024 I Hospital Day: 0    Assessment & Plan   Anxiety  Assessment & Plan  Patient with anxiety.  Takes Atarax at home which is helpful give her some tonight.    Hypoglycemia  Assessment & Plan  Patient's blood sugar is very low here in the emergency room.  It improved about 300 after she ate.  On it check her sugars every couple hours overnight and see where she stands when she is not eating.  I do not know why her sugars are so low I am unsure if it is because she did not eat much prior to coming in system hypoglycemia can cause headaches.  Not impossible she may have an insulinoma.  I will check her sugars every couple hours over the next 10 hours    Anemia  Assessment & Plan  Patient has a hemoglobin of 7 which is low.  May be contributing to her headaches.  She does not eat a bunch of meat.  I wonder if this may be dietary.  Does not report heavy periods.  I will check iron levels    * Other headache syndrome  Assessment & Plan  Patient has a history with intermittent headaches.  She takes Neurontin which she says is not very helpful but she takes about 100 mg daily.  She had a MRI back about a month ago that was unremarkable she had an MRA here there is unremarkable.  I do believe these headaches seem to be unilateral and seem to exist almost on a daily basis.  They seem somewhat consistent with migraines.  I will try her on a tricyclic antidepressant to take 25 mg daily nightly.  I would also use Imitrex as needed.  She can follow-up with her PCP.       VTE Pharmacologic Prophylaxis:   Low Risk (Score 0-2) - Encourage Ambulation.  Code Status: No Order full  Discussion with family: Updated  (pt) at bedside.    Anticipated Length of Stay: Patient will be admitted on an observation basis with an anticipated length of stay of  less than 2 midnights secondary to headache.    Total Time Spent on Date of Encounter in care of patient: 30   mins. This time was spent on one or more of the following: performing physical exam; counseling and coordination of care; obtaining or reviewing history; documenting in the medical record; reviewing/ordering tests, medications or procedures; communicating with other healthcare professionals and discussing with patient's family/caregivers.    Chief Complaint: Headache    History of Present Illness:  Analilia Mathew is a 28 y.o. female with a PMH of headaches and anxiety who presents with complaint of headache.  Patient reported a very intense awful headache this evening necessitating her visit to the emergency room.  Her hemodynamics were stable her blood pressure was fine but she had a blood sugar of 38 and a hemoglobin of 7 her indices were consistent with anemia.  In the ED a CTA was done which was unremarkable for any structural lesion.  The headache gradually got better.  She had no neurological deficits to include vision changes, focal deficits, or vomiting.  No fever.  No leukocytosis.  Patient does have a history of headaches that occur almost daily.  Regarding the anemia the patient reports her periods are not heavy.  Does not eat a lot of meat.  Her sugar being low is also is somewhat surprising to her.  She has no diabetes not take any medicines at all.  Her sugar did increase to the high 100s with some crackers but then dropped down to 75.  She had a significant amount of went up to 300.  This week the patient today which is on the floor she denies any fevers, chills, rigors, reports some mild headache at her base of her neck but is much better no lower extremity swelling    Review of Systems:  Review of Systems   Constitutional:  Negative for activity change, appetite change, chills, diaphoresis, fatigue and fever.   HENT:  Positive for mouth sores. Negative for congestion, dental problem,  drooling, nosebleeds, rhinorrhea, sinus pain, sore throat and tinnitus.    Eyes:  Negative for discharge.   Respiratory:  Positive for apnea. Negative for cough, choking, chest tightness and shortness of breath.    Cardiovascular:  Negative for chest pain and leg swelling.   Gastrointestinal:  Negative for abdominal distention, abdominal pain, anal bleeding, constipation and nausea.   Endocrine: Negative for cold intolerance.   Genitourinary:  Positive for frequency. Negative for difficulty urinating, dysuria and genital sores.   Musculoskeletal:  Negative for arthralgias, back pain, gait problem and joint swelling.   Neurological:  Positive for seizures, numbness and headaches. Negative for dizziness, tremors, syncope, facial asymmetry, speech difficulty and light-headedness.   Hematological:  Negative for adenopathy.   Psychiatric/Behavioral:  Negative for agitation, behavioral problems and confusion.        Past Medical and Surgical History:   Past Medical History:   Diagnosis Date    Anemia     chronic-    Anxiety     Fatty liver     History of transfusion     Low back pain     Pancreatitis     Pilonidal cyst        Past Surgical History:   Procedure Laterality Date    ABDOMINAL SURGERY       SECTION      CHOLANGIOGRAM N/A 2018    Procedure: CHOLANGIOGRAM;  Surgeon: Bran Williamson MD;  Location:  MAIN OR;  Service: General    CHOLECYSTECTOMY LAPAROSCOPIC N/A 2018    Procedure: CHOLECYSTECTOMY LAPAROSCOPIC;  Surgeon: Bran Williamson MD;  Location:  MAIN OR;  Service: General    DILATION AND CURETTAGE OF UTERUS      FL CHOLANGIOGRAM T TUBE  2018    CT EXCISION PILONIDAL CYST/SINUS COMPLICATED N/A 2022    Procedure: EXCISION PILONIDAL CYST;  Surgeon: Leighann Gomez DO;  Location:  MAIN OR;  Service: General    ELA-EN-Y PROCEDURE  2018       Meds/Allergies:  Prior to Admission medications    Medication Sig Start Date End Date Taking? Authorizing Provider    hydrOXYzine HCL (ATARAX) 25 mg tablet Take 25 mg by mouth every 6 (six) hours as needed for itching   Yes Historical Provider, MD   acetaminophen (TYLENOL) 500 mg tablet Take 1,000 mg by mouth every 6 (six) hours as needed for mild pain  Patient not taking: Reported on 2/22/2023    Historical Provider, MD   ferrous sulfate 325 (65 Fe) mg tablet Take 325 mg by mouth daily with breakfast  Patient not taking: Reported on 2/22/2023    Historical Provider, MD   Ibuprofen-diphenhydrAMINE Cit (ADVIL PM PO) Take 1 tablet by mouth daily at bedtime  Patient not taking: Reported on 11/3/2022    Historical Provider, MD     I have reviewed home medications with patient personally.    Allergies:   Allergies   Allergen Reactions    Morphine Hives     Itching as welll       Social History:  Marital Status: Single   Occupation:   Patient Pre-hospital Living Situation: Home  Patient Pre-hospital Level of Mobility: walks  Patient Pre-hospital Diet Restrictions: none  Substance Use History:   Social History     Substance and Sexual Activity   Alcohol Use No     Social History     Tobacco Use   Smoking Status Every Day    Current packs/day: 0.25    Types: Cigarettes   Smokeless Tobacco Never   Tobacco Comments    Pt reports smoking 4-5 cigarettes a day     Social History     Substance and Sexual Activity   Drug Use Never       Family History:      Physical Exam:     Vitals:   Blood Pressure: 105/63 (07/17/24 0146)  Pulse: 101 (07/17/24 0146)  Temperature: 97.7 °F (36.5 °C) (07/17/24 0146)  Temp Source: Temporal (07/16/24 2001)  Respirations: 16 (07/17/24 0130)  Weight - Scale: 56.3 kg (124 lb 1.9 oz) (07/16/24 2001)  SpO2: 98 % (07/17/24 0146)    Physical Exam  Constitutional:       Appearance: Normal appearance.   HENT:      Head: Normocephalic and atraumatic.      Right Ear: Tympanic membrane normal.      Left Ear: Tympanic membrane normal.      Nose: Nose normal.      Mouth/Throat:      Mouth: Mucous membranes are moist.  "  Eyes:      Pupils: Pupils are equal, round, and reactive to light.   Cardiovascular:      Rate and Rhythm: Normal rate and regular rhythm.   Pulmonary:      Effort: Pulmonary effort is normal. No respiratory distress.      Breath sounds: Normal breath sounds. No stridor.   Abdominal:      General: Abdomen is flat. There is no distension.      Palpations: Abdomen is soft.   Musculoskeletal:         General: Tenderness present. No swelling. Normal range of motion.      Cervical back: Rigidity and tenderness present.   Skin:     Capillary Refill: Capillary refill takes less than 2 seconds.      Coloration: Skin is not jaundiced or pale.   Neurological:      General: No focal deficit present.      Mental Status: She is alert and oriented to person, place, and time.      Cranial Nerves: No cranial nerve deficit.      Sensory: No sensory deficit.          Additional Data:     Lab Results:  Results from last 7 days   Lab Units 07/16/24  2112   WBC Thousand/uL 7.41   HEMOGLOBIN g/dL 7.0*   HEMATOCRIT % 25.3*   PLATELETS Thousands/uL 461*   SEGS PCT % 56   LYMPHO PCT % 30   MONO PCT % 12   EOS PCT % 1     Results from last 7 days   Lab Units 07/16/24  2112   SODIUM mmol/L 137   POTASSIUM mmol/L 4.2   CHLORIDE mmol/L 108   CO2 mmol/L 25   BUN mg/dL 9   CREATININE mg/dL 0.61   ANION GAP mmol/L 4   CALCIUM mg/dL 8.7   GLUCOSE RANDOM mg/dL 38*         Results from last 7 days   Lab Units 07/17/24  0144 07/17/24  0050 07/16/24  2355 07/16/24  2324 07/16/24  2226 07/16/24  2206   POC GLUCOSE mg/dl 302* 140 73 75 169* 41*     No results found for: \"HGBA1C\"        Lines/Drains:  Invasive Devices       Peripheral Intravenous Line  Duration             Peripheral IV 07/16/24 Left;Ventral (anterior) Forearm <1 day                        Imaging: Reviewed radiology reports from this admission including: CT head  CTA head and neck with and without contrast   Final Result by Miles Hall DO (07/16 2300)      CT Brain:  No acute " intracranial abnormality.      CT Angiography:  Unremarkable CTA neck and brain.                  Workstation performed: [id number]             EKG and Other Studies Reviewed on Admission:   EKG: Personally Reviewed.    ** Please Note: This note has been constructed using a voice recognition system. **

## 2024-07-17 NOTE — PLAN OF CARE
Problem: METABOLIC, FLUID AND ELECTROLYTES - ADULT  Goal: Electrolytes maintained within normal limits  Description: INTERVENTIONS:  - Monitor labs and assess patient for signs and symptoms of electrolyte imbalances monitor blood sugars  - Administer electrolyte replacement as ordered  - Monitor response to electrolyte replacements, including repeat lab results as appropriate  - Instruct patient on fluid and nutrition as appropriate  Outcome: Progressing

## 2024-07-17 NOTE — ED NOTES
"Patient states she has a \"weird sensation in her head\" and feels like her \"face is burning.\" Provider notified.     Lisa Willis RN  07/16/24 2130    "

## 2024-07-17 NOTE — PLAN OF CARE
Problem: METABOLIC, FLUID AND ELECTROLYTES - ADULT  Goal: Electrolytes maintained within normal limits  Description: INTERVENTIONS:  - Monitor labs and assess patient for signs and symptoms of electrolyte imbalances monitor blood sugars  - Administer electrolyte replacement as ordered  - Monitor response to electrolyte replacements, including repeat lab results as appropriate  - Instruct patient on fluid and nutrition as appropriate  7/17/2024 1447 by Veronica Arboleda LPN  Outcome: Adequate for Discharge  7/17/2024 0917 by Veronica Arboleda LPN  Outcome: Progressing  Goal: Fluid balance maintained  Description: INTERVENTIONS:  - Monitor labs   - Monitor I/O and WT  - Instruct patient on fluid and nutrition as appropriate  - Assess for signs & symptoms of volume excess or deficit  7/17/2024 1447 by Veronica Arboleda LPN  Outcome: Adequate for Discharge  7/17/2024 0917 by Veronica Arboleda LPN  Outcome: Progressing  Goal: Glucose maintained within target range  Description: INTERVENTIONS:  - Monitor Blood Glucose as ordered  - Assess for signs and symptoms of hyperglycemia and hypoglycemia  - Administer ordered medications to maintain glucose within target range  - Assess nutritional intake and initiate nutrition service referral as needed  7/17/2024 1447 by Veronica Arboleda LPN  Outcome: Adequate for Discharge  7/17/2024 0917 by Veronica Arboleda LPN  Outcome: Progressing     Problem: PAIN - ADULT  Goal: Verbalizes/displays adequate comfort level or baseline comfort level  Description: Interventions:  - Encourage patient to monitor pain and request assistance  - Assess pain using appropriate pain scale  - Administer analgesics based on type and severity of pain and evaluate response  - Implement non-pharmacological measures as appropriate and evaluate response  - Consider cultural and social influences on pain and pain management  - Notify physician/advanced practitioner if interventions unsuccessful or patient reports  new pain  7/17/2024 1447 by Veronica Arboleda LPN  Outcome: Adequate for Discharge  7/17/2024 0917 by Veronica Arboleda LPN  Outcome: Progressing     Problem: INFECTION - ADULT  Goal: Absence or prevention of progression during hospitalization  Description: INTERVENTIONS:  - Assess and monitor for signs and symptoms of infection  - Monitor lab/diagnostic results  - Monitor all insertion sites, i.e. indwelling lines, tubes, and drains  - Monitor endotracheal if appropriate and nasal secretions for changes in amount and color  - [place] appropriate cooling/warming therapies per order  - Administer medications as ordered  - Instruct and encourage patient and family to use good hand hygiene technique  - Identify and instruct in appropriate isolation precautions for identified infection/condition  7/17/2024 1447 by Veronica Arboleda LPN  Outcome: Adequate for Discharge  7/17/2024 0917 by Veronica Arboleda LPN  Outcome: Progressing  Goal: Absence of fever/infection during neutropenic period  Description: INTERVENTIONS:  - Monitor WBC    7/17/2024 1447 by Veronica Arboleda LPN  Outcome: Adequate for Discharge  7/17/2024 0917 by Veronica Arboleda LPN  Outcome: Progressing     Problem: SAFETY ADULT  Goal: Patient will remain free of falls  Description: INTERVENTIONS:  - Educate patient/family on patient safety including physical limitations  - Instruct patient to call for assistance with activity   - Consult OT/PT to assist with strengthening/mobility   - Keep Call bell within reach  - Keep bed low and locked with side rails adjusted as appropriate  - Keep care items and personal belongings within reach  - Initiate and maintain comfort rounds  - Make Fall Risk Sign visible to staff  - Offer Toileting every 2 Hours, in advance of need  - Initiate/Maintain   alarm  - Obtain necessary fall risk management equipment:     - Apply yellow socks and bracelet for high fall risk patients  - Consider moving patient to room near nurses  station  7/17/2024 1447 by Veronica Arboleda LPN  Outcome: Adequate for Discharge  7/17/2024 0917 by Veronica Arboleda LPN  Outcome: Progressing  Goal: Maintain or return to baseline ADL function  Description: INTERVENTIONS:  -  Assess patient's ability to carry out ADLs; assess patient's baseline for ADL function and identify physical deficits which impact ability to perform ADLs (bathing, care of mouth/teeth, toileting, grooming, dressing, etc.)  - Assess/evaluate cause of self-care deficits   - Assess range of motion  - Assess patient's mobility; develop plan if impaired  - Assess patient's need for assistive devices and provide as appropriate  - Encourage maximum independence but intervene and supervise when necessary  - Involve family in performance of ADLs  - Assess for home care needs following discharge   - Consider OT consult to assist with ADL evaluation and planning for discharge  - Provide patient education as appropriate  7/17/2024 1447 by Veronica Arboleda LPN  Outcome: Adequate for Discharge  7/17/2024 0917 by Veronica Arboleda LPN  Outcome: Progressing  Goal: Maintains/Returns to pre admission functional level  Description: INTERVENTIONS:  - Perform AM-PAC 6 Click Basic Mobility/ Daily Activity assessment daily.  - Set and communicate daily mobility goal to care team and patient/family/caregiver.   - Collaborate with rehabilitation services on mobility goals if consulted  - Perform Range of Motion 3 times a day.  - Reposition patient every 2 hours.  - Dangle patient 3 times a day  - Stand patient 3 times a day  - Ambulate patient 3 times a day  - Out of bed to chair 3 times a day   - Out of bed for meals 3 times a day  - Out of bed for toileting  - Record patient progress and toleration of activity level   7/17/2024 1447 by Veronica Arboleda LPN  Outcome: Adequate for Discharge  7/17/2024 0917 by Veronica Arboleda LPN  Outcome: Progressing     Problem: DISCHARGE PLANNING  Goal: Discharge to home or other  facility with appropriate resources  Description: INTERVENTIONS:  - Identify barriers to discharge w/patient and caregiver  - Arrange for needed discharge resources and transportation as appropriate  - Identify discharge learning needs (meds, wound care, etc.)  - Arrange for interpretive services to assist at discharge as needed  - Refer to Case Management Department for coordinating discharge planning if the patient needs post-hospital services based on physician/advanced practitioner order or complex needs related to functional status, cognitive ability, or social support system  7/17/2024 1447 by Veronica Arboleda LPN  Outcome: Adequate for Discharge  7/17/2024 0917 by Veronica Arboleda LPN  Outcome: Progressing     Problem: Knowledge Deficit  Goal: Patient/family/caregiver demonstrates understanding of disease process, treatment plan, medications, and discharge instructions  Description: Complete learning assessment and assess knowledge base.  Interventions:  - Provide teaching at level of understanding  - Provide teaching via preferred learning methods  7/17/2024 1447 by Veronica Arboleda LPN  Outcome: Adequate for Discharge  7/17/2024 0917 by Veronica Arboleda LPN  Outcome: Progressing

## 2024-07-17 NOTE — ED NOTES
Pt given soda, crackers, turkey sandwich and applesauce as pt was requesting food and drink.     Whit Ortiz RN  07/17/24 0119       Whit Ortiz RN  07/17/24 0146

## 2025-01-22 ENCOUNTER — APPOINTMENT (EMERGENCY)
Dept: CT IMAGING | Facility: HOSPITAL | Age: 30
End: 2025-01-22
Payer: COMMERCIAL

## 2025-01-22 ENCOUNTER — HOSPITAL ENCOUNTER (EMERGENCY)
Facility: HOSPITAL | Age: 30
Discharge: HOME | End: 2025-01-22
Attending: EMERGENCY MEDICINE
Payer: COMMERCIAL

## 2025-01-22 VITALS
WEIGHT: 128 LBS | SYSTOLIC BLOOD PRESSURE: 106 MMHG | RESPIRATION RATE: 16 BRPM | BODY MASS INDEX: 22.67 KG/M2 | OXYGEN SATURATION: 98 % | TEMPERATURE: 98.4 F | DIASTOLIC BLOOD PRESSURE: 71 MMHG | HEART RATE: 86 BPM
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
## 2025-01-22 DIAGNOSIS — S09.90XA INJURY OF HEAD, INITIAL ENCOUNTER: ICD-10-CM

## 2025-01-22 PROCEDURE — 70450 CT HEAD/BRAIN W/O DYE: CPT

## 2025-01-22 PROCEDURE — 99284 EMERGENCY DEPT VISIT MOD MDM: CPT

## 2025-01-22 PROCEDURE — 99284 EMERGENCY DEPT VISIT MOD MDM: CPT | Performed by: EMERGENCY MEDICINE

## 2025-01-22 RX ORDER — ACETAMINOPHEN 325 MG/1
650 TABLET ORAL ONCE
Status: COMPLETED | OUTPATIENT
Start: 2025-01-22 | End: 2025-01-22

## 2025-01-22 RX ADMIN — ACETAMINOPHEN 325MG 650 MG: 325 TABLET ORAL at 16:11
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
## 2025-04-30 ENCOUNTER — TELEPHONE (OUTPATIENT)
Dept: DENTISTRY | Facility: CLINIC | Age: 30
End: 2025-04-30

## 2025-04-30 NOTE — TELEPHONE ENCOUNTER
PT called in hopes of getting dental appt.  She said she was actually at [facility] yesterday and they told her she needs and extraction - and quoted her an astronomical amount of $ for dental work.  I informed pt that we do not typically do extractions and although we aren't accepting np's we do try to help people who are in pain.  I asked PT if they prescribed antibiotic - she said they told her that insurance wouldn't pay and they did not prescribe.  I asked if they provided her with a referral, she said they would not provide that for her to get extraction elsewhere.    PT reports that she has a cracked tooth and that she has pain, including throat and ear pain.      I informed of our emerg appts, referred to PCP and insurance listing, and told her to call me back tomorrow to see if we have any openings.  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    SB

## (undated) DEVICE — DRAPE C-ARM X-RAY

## (undated) DEVICE — [HIGH FLOW INSUFFLATOR,  DO NOT USE IF PACKAGE IS DAMAGED,  KEEP DRY,  KEEP AWAY FROM SUNLIGHT,  PROTECT FROM HEAT AND RADIOACTIVE SOURCES.]: Brand: PNEUMOSURE

## (undated) DEVICE — CHLORAPREP HI-LITE 26ML ORANGE

## (undated) DEVICE — SPONGE STICK WITH PVP-I: Brand: KENDALL

## (undated) DEVICE — SUT VICRYL 0 UR-6 27 IN J603H

## (undated) DEVICE — ABDOMINAL PAD: Brand: DERMACEA

## (undated) DEVICE — STERILE POLYISOPRENE POWDER-FREE SURGICAL GLOVES: Brand: PROTEXIS

## (undated) DEVICE — TAUT CATH INTRODUCER 4.5 FR

## (undated) DEVICE — TROCAR: Brand: KII FIOS FIRST ENTRY

## (undated) DEVICE — GAUZE SPONGES,16 PLY: Brand: CURITY

## (undated) DEVICE — 3L THIN WALL CAN: Brand: CRD

## (undated) DEVICE — GLOVE INDICATOR PI UNDERGLOVE SZ 6.5 BLUE

## (undated) DEVICE — TROCAR: Brand: KII® SLEEVE

## (undated) DEVICE — PENCIL ELECTROSURG E-Z CLEAN -0035H

## (undated) DEVICE — SWABSTCK, BENZOIN TINCTURE, 1/PK, STRL: Brand: APLICARE

## (undated) DEVICE — IRRIG ENDO FLO TUBING

## (undated) DEVICE — UNDERGLOVE PROTEXIS  BLUE SZ 7

## (undated) DEVICE — TUBING SUCTION 5MM X 12 FT

## (undated) DEVICE — CHOLE CATH KIT ARROW

## (undated) DEVICE — SUT ETHILON 3-0 FS-1 18 IN 663G

## (undated) DEVICE — TUBE EXTENSION MALE LUER 35IN

## (undated) DEVICE — NEPTUNE E-SEP SMOKE EVACUATION PENCIL, COATED, 70MM BLADE, PUSH BUTTON SWITCH: Brand: NEPTUNE E-SEP

## (undated) DEVICE — SUT MONOCRYL 4-0 PS-2 27 IN Y426H

## (undated) DEVICE — NEEDLE BLUNT 18 G X 1 1/2IN

## (undated) DEVICE — LIGAMAX 5 MM ENDOSCOPIC MULTIPLE CLIP APPLIER: Brand: LIGAMAX

## (undated) DEVICE — DRAPE EQUIPMENT RF WAND

## (undated) DEVICE — BETHLEHEM UNIVERSAL MINOR GEN: Brand: CARDINAL HEALTH

## (undated) DEVICE — GLOVE SRG BIOGEL 6

## (undated) DEVICE — NEEDLE 25G X 1 1/2

## (undated) DEVICE — 3M™ STERI-STRIP™ REINFORCED ADHESIVE SKIN CLOSURES, R1547, 1/2 IN X 4 IN (12 MM X 100 MM), 6 STRIPS/ENVELOPE: Brand: 3M™ STERI-STRIP™

## (undated) DEVICE — PAD GROUNDING ADULT

## (undated) DEVICE — STERILE LATEX POWDER-FREE SURGICAL GLOVESWITH NITRILE COATING: Brand: PROTEXIS

## (undated) DEVICE — IV CATH 14 G X 1.75

## (undated) DEVICE — INTENDED FOR TISSUE SEPARATION, AND OTHER PROCEDURES THAT REQUIRE A SHARP SURGICAL BLADE TO PUNCTURE OR CUT.: Brand: BARD-PARKER SAFETY BLADES SIZE 11, STERILE

## (undated) DEVICE — SYRINGE 30ML LL

## (undated) DEVICE — NEEDLE INSUFF STEP VERRES 14G

## (undated) DEVICE — WET SKIN PREP TRAY: Brand: MEDLINE INDUSTRIES, INC.

## (undated) DEVICE — VIAL DECANTER

## (undated) DEVICE — SUT VICRYL 3-0 SH 27 IN J416H

## (undated) DEVICE — ALLENTOWN LAP CHOLE APP PACK: Brand: CARDINAL HEALTH

## (undated) DEVICE — INTENDED FOR TISSUE SEPARATION, AND OTHER PROCEDURES THAT REQUIRE A SHARP SURGICAL BLADE TO PUNCTURE OR CUT.: Brand: BARD-PARKER SAFETY BLADES SIZE 15, STERILE

## (undated) DEVICE — PRESSURE TUBING: Brand: TRUWAVE

## (undated) DEVICE — MEDI-VAC YANK SUCT HNDL W/TPRD BULBOUS TIP: Brand: CARDINAL HEALTH